# Patient Record
Sex: FEMALE | Race: BLACK OR AFRICAN AMERICAN | NOT HISPANIC OR LATINO | Employment: FULL TIME | URBAN - NONMETROPOLITAN AREA
[De-identification: names, ages, dates, MRNs, and addresses within clinical notes are randomized per-mention and may not be internally consistent; named-entity substitution may affect disease eponyms.]

---

## 2020-04-23 ENCOUNTER — HISTORICAL (OUTPATIENT)
Dept: ADMINISTRATIVE | Facility: HOSPITAL | Age: 30
End: 2020-04-23

## 2020-04-24 LAB — CALCIUM BLD-MCNC: NEGATIVE MG/DL

## 2020-05-07 ENCOUNTER — HISTORICAL (OUTPATIENT)
Dept: ADMINISTRATIVE | Facility: HOSPITAL | Age: 30
End: 2020-05-07

## 2020-05-07 LAB
ALBUMIN SERPL BCP-MCNC: 3.4 G/DL (ref 3.5–5)
ALBUMIN/GLOB SERPL ELPH: 0.8 {RATIO} (ref 1.5–2.2)
ALP SERPL-CCNC: 46 U/L (ref 45–117)
ALT SERPL W P-5'-P-CCNC: 23 U/L (ref 13–56)
ANION GAP SERPL CALC-SCNC: 11 MEQ/L (ref 10–20)
APPEARANCE, UA: CLEAR
AST SERPL-CCNC: 19 U/L (ref 15–37)
BACTERIA SPEC CULT: NEGATIVE /HPF
BASOPHILS NFR BLD: 0 10 (ref 0–0.1)
BASOPHILS NFR BLD: 0.4 % (ref 0–1.5)
BILIRUB SERPL-MCNC: 0.24 MG/DL (ref 0.2–1)
BILIRUB UR QL STRIP: NEGATIVE MG/DL
BUDDING YEAST: NORMAL /HPF
BUN SERPL-MCNC: 9 MG/DL (ref 7–18)
CALCIUM SERPL-MCNC: 8.1 MG/DL (ref 8.5–10.1)
CASTS, URINE MICROSCOPIC: NEGATIVE /LPF
CHLORIDE SERPL-SCNC: 109 MMOL/L (ref 98–107)
CHOLEST SERPL-MSCNC: 222 MG/DL (ref 0–200)
CHOLEST/HDLC SERPL: 5.2 {RATIO}
CO2 SERPL-SCNC: 24 MMOL/L (ref 22–32)
COLOR UR: YELLOW
CREAT SERPL-MCNC: 0.76 MG/DL (ref 0.55–1.02)
EGFR: 114 ML/MIN/1.73M
EOSINOPHIL NFR BLD: 0.1 10 (ref 0–0.7)
EOSINOPHIL NFR BLD: 1.9 % (ref 0–7)
EPITHELIAL, URINE MICROSCOPIC: NEGATIVE /HPF
ERYTHROCYTE [DISTWIDTH] IN BLOOD BY AUTOMATED COUNT: 12.7 % (ref 11.5–14.5)
ESTIMATED AVG GLUCOSE: 5.7 % (ref 4.2–6.3)
GLOBULIN: 4.1 G/DL (ref 2.3–3.5)
GLUCOSE (UA): NEGATIVE MG/DL
GLUCOSE SERPL-MCNC: 91 MG/DL (ref 70–99)
GRAN #: 4.06 10 (ref 2–7.5)
GRAN%: 0.3 %
GRAN%: 59.9 % (ref 50–80)
HCT VFR BLD AUTO: 36.8 % (ref 37.7–47.9)
HDL/CHOLESTEROL RATIO: 43 MG/DL (ref 40–60)
HGB BLD-MCNC: 11.7 G/DL (ref 12.2–16.2)
HGB UR QL STRIP: NEGATIVE ERY/UL
IMMATURE GRANULOCYTES #: 0.02 10
KETONES UR QL STRIP: NEGATIVE MG/DL
LDLC SERPL CALC-MCNC: 158 MG/DL (ref 0–130)
LEUKOCYTE ESTERASE UR QL STRIP: NEGATIVE LEU/UL
LYMPH #: 2.1 10 (ref 1–3.5)
LYMPH%: 31.2 % (ref 12–50)
Lab: 27.2 % (ref 30–39)
MAGNESIUM SERPL-MCNC: 1.96 MG/DL (ref 1.8–2.4)
MCH RBC QN AUTO: 26.1 PG (ref 27–31)
MCHC RBC AUTO-ENTMCNC: 31.8 G% (ref 32–35)
MCV RBC AUTO: 82.1 FL (ref 80–97)
MONO #: 0.4 10 (ref 0–0.8)
MONO%: 6.3 % (ref 0–12)
NITRITE UR QL STRIP: NEGATIVE MG/DL
OSMOC: 278 MOSM/KG (ref 275–295)
PH UR STRIP: 5.5 [PH] (ref 5–7.5)
PMV BLD AUTO: 10 FL (ref 7.4–10.4)
PMV BLD AUTO: 185 10 (ref 142–424)
POTASSIUM SERPL-SCNC: 4 MMOL/L (ref 3.5–5.1)
PROT SERPL-MCNC: 7.5 G/DL (ref 6.4–8.2)
PROT UR QL STRIP: NEGATIVE MG/DL
RBC # BLD AUTO: 4.48 M/UL (ref 4.04–5.48)
RBC #/AREA URNS HPF: NEGATIVE /HPF
RPR: NON REACTIVE
SODIUM BLD-SCNC: 140 MMOL/L (ref 136–145)
SP GR UR STRIP: 1.02 (ref 1–1.03)
SPERM, URINE MICROSCOPIC: NORMAL /HPF
T4,THYROXINE: 10.4 UG/DL (ref 4.8–13.9)
T7: 2.8 (ref 1.4–4.5)
TRIGL SERPL-MCNC: 105 MG/ML (ref 30–150)
TSH SERPL DL<=0.005 MIU/L-ACNC: 1.42 UIU/ML (ref 0.36–3.74)
TYPE OF SPECIMEN  (UA): NORMAL
UNCLASSIFIED CRYSTALS, UA: NORMAL /HPF
UROBILINOGEN UR STRIP-ACNC: 1 EU/L
WBC # BLD AUTO: 6.8 10 (ref 4–10.2)
WBC #/AREA URNS HPF: NEGATIVE /HPF

## 2020-05-08 LAB
25(OH)D3 SERPL-MCNC: 32.3 NG/ML (ref 30–100)
HIV 1+2 AB+HIV1 P24 AG SERPL QL IA: NON REACTIVE
IGA SERPL-MCNC: 197 MG/DL (ref 87–352)
IGG SERPL-MCNC: 1839 MG/DL (ref 586–1602)
IGM: 260 MG/DL (ref 26–217)

## 2020-05-09 LAB — T4 FREE SP9 P CHAL SERPL-SCNC: 1 NG/DL (ref 0.76–1.46)

## 2020-05-12 LAB
H PYLORI IGG SER IA-ACNC: 0.31 INDEX VALUE (ref 0–0.79)
Lab: <9 UNITS (ref 0–8.9)

## 2020-05-13 LAB — Lab: <9 UNITS (ref 0–8.9)

## 2020-09-16 ENCOUNTER — LAB VISIT (OUTPATIENT)
Dept: LAB | Facility: HOSPITAL | Age: 30
End: 2020-09-16
Attending: INTERNAL MEDICINE
Payer: COMMERCIAL

## 2020-09-16 DIAGNOSIS — Z01.21 ENCOUNTER FOR DENTAL EXAMINATION AND CLEANING WITH ABNORMAL FINDINGS: ICD-10-CM

## 2020-09-16 DIAGNOSIS — Z13.9 SCREENING FOR UNSPECIFIED CONDITION: Primary | ICD-10-CM

## 2020-09-16 LAB
25(OH)D3+25(OH)D2 SERPL-MCNC: 25 NG/ML (ref 30–96)
ALBUMIN SERPL BCP-MCNC: 3.5 G/DL (ref 3.5–5.2)
ALBUMIN/CREAT UR: ABNORMAL MG/MG (ref 0–30)
ALP SERPL-CCNC: 41 U/L (ref 55–135)
ALT SERPL W/O P-5'-P-CCNC: 30 U/L (ref 10–44)
ANION GAP SERPL CALC-SCNC: 4 MMOL/L (ref 8–16)
AST SERPL-CCNC: 26 U/L (ref 10–40)
BASOPHILS # BLD AUTO: 0.02 K/UL (ref 0–0.2)
BASOPHILS NFR BLD: 0.3 % (ref 0–1.9)
BILIRUB SERPL-MCNC: 0.3 MG/DL (ref 0.1–1)
BUN SERPL-MCNC: 7 MG/DL (ref 6–20)
CALCIUM SERPL-MCNC: 8.6 MG/DL (ref 8.7–10.5)
CHLORIDE SERPL-SCNC: 109 MMOL/L (ref 95–110)
CHOLEST SERPL-MCNC: 193 MG/DL (ref 120–199)
CHOLEST/HDLC SERPL: 5.7 {RATIO} (ref 2–5)
CO2 SERPL-SCNC: 26 MMOL/L (ref 23–29)
CREAT SERPL-MCNC: 0.8 MG/DL (ref 0.5–1.4)
CREAT UR-MCNC: 342 MG/DL (ref 15–325)
DIFFERENTIAL METHOD: ABNORMAL
EOSINOPHIL # BLD AUTO: 0.1 K/UL (ref 0–0.5)
EOSINOPHIL NFR BLD: 1.5 % (ref 0–8)
ERYTHROCYTE [DISTWIDTH] IN BLOOD BY AUTOMATED COUNT: 12.9 % (ref 11.5–14.5)
EST. GFR  (AFRICAN AMERICAN): >60 ML/MIN/1.73 M^2
EST. GFR  (NON AFRICAN AMERICAN): >60 ML/MIN/1.73 M^2
ESTIMATED AVG GLUCOSE: 117 MG/DL (ref 68–131)
FERRITIN SERPL-MCNC: 58 NG/ML (ref 20–300)
FOLATE SERPL-MCNC: 23.6 NG/ML (ref 4–24)
GLUCOSE SERPL-MCNC: 85 MG/DL (ref 70–110)
HBA1C MFR BLD HPLC: 5.7 % (ref 4–5.6)
HCT VFR BLD AUTO: 34.8 % (ref 37–48.5)
HCYS SERPL-SCNC: 6.6 UMOL/L (ref 4–15.5)
HDLC SERPL-MCNC: 34 MG/DL (ref 40–75)
HDLC SERPL: 17.6 % (ref 20–50)
HGB BLD-MCNC: 10.9 G/DL (ref 12–16)
IMM GRANULOCYTES # BLD AUTO: 0.02 K/UL (ref 0–0.04)
IMM GRANULOCYTES NFR BLD AUTO: 0.3 % (ref 0–0.5)
INSULIN COLLECTION INTERVAL: NORMAL
INSULIN SERPL-ACNC: 8.5 UU/ML
IRON SATN MFR SERPL: 18 % (ref 20–50)
IRON SERPL-MCNC: 55 UG/DL (ref 30–160)
LDLC SERPL CALC-MCNC: 126.8 MG/DL (ref 63–159)
LYMPHOCYTES # BLD AUTO: 2.5 K/UL (ref 1–4.8)
LYMPHOCYTES NFR BLD: 34.5 % (ref 18–48)
MAGNESIUM SERPL-MCNC: 1.9 MG/DL (ref 1.6–2.6)
MCH RBC QN AUTO: 25.7 PG (ref 27–31)
MCHC RBC AUTO-ENTMCNC: 31.3 G/DL (ref 32–36)
MCV RBC AUTO: 82 FL (ref 82–98)
MICROALBUMIN UR DL<=1MG/L-MCNC: 31.3 MG/L
MONOCYTES # BLD AUTO: 0.4 K/UL (ref 0.3–1)
MONOCYTES NFR BLD: 6.2 % (ref 4–15)
NEUTROPHILS # BLD AUTO: 4.1 K/UL (ref 1.8–7.7)
NEUTROPHILS NFR BLD: 57.2 % (ref 38–73)
NONHDLC SERPL-MCNC: 159 MG/DL
NRBC BLD-RTO: 0 /100 WBC
PLATELET # BLD AUTO: 281 K/UL (ref 150–350)
PMV BLD AUTO: 9.1 FL (ref 9.2–12.9)
POTASSIUM SERPL-SCNC: 3.8 MMOL/L (ref 3.5–5.1)
PROT SERPL-MCNC: 8.1 G/DL (ref 6–8.4)
PROT UR-MCNC: 20.8 MG/DL (ref 0–15)
RBC # BLD AUTO: 4.24 M/UL (ref 4–5.4)
RETICS/RBC NFR AUTO: 1.6 % (ref 0.5–2.5)
SODIUM SERPL-SCNC: 139 MMOL/L (ref 136–145)
T3FREE SERPL-MCNC: 3.6 PG/ML (ref 2.3–4.2)
T4 FREE SERPL-MCNC: 0.88 NG/DL (ref 0.71–1.51)
T4 SERPL-MCNC: 10.7 UG/DL (ref 4.5–11.5)
TOTAL IRON BINDING CAPACITY: 305 UG/DL (ref 250–450)
TRIGL SERPL-MCNC: 161 MG/DL (ref 30–150)
TSH SERPL DL<=0.005 MIU/L-ACNC: 0.64 UIU/ML (ref 0.4–4)
URATE SERPL-MCNC: 5.3 MG/DL (ref 2.4–5.7)
VIT B12 SERPL-MCNC: 1047 PG/ML (ref 210–950)
WBC # BLD AUTO: 7.13 K/UL (ref 3.9–12.7)

## 2020-09-16 PROCEDURE — 84436 ASSAY OF TOTAL THYROXINE: CPT

## 2020-09-16 PROCEDURE — 80061 LIPID PANEL: CPT

## 2020-09-16 PROCEDURE — 83036 HEMOGLOBIN GLYCOSYLATED A1C: CPT

## 2020-09-16 PROCEDURE — 83525 ASSAY OF INSULIN: CPT

## 2020-09-16 PROCEDURE — 36415 COLL VENOUS BLD VENIPUNCTURE: CPT

## 2020-09-16 PROCEDURE — 82043 UR ALBUMIN QUANTITATIVE: CPT

## 2020-09-16 PROCEDURE — 83540 ASSAY OF IRON: CPT

## 2020-09-16 PROCEDURE — 84165 PATHOLOGIST INTERPRETATION SPE: ICD-10-PCS | Mod: 26,,, | Performed by: PATHOLOGY

## 2020-09-16 PROCEDURE — 85025 COMPLETE CBC W/AUTO DIFF WBC: CPT

## 2020-09-16 PROCEDURE — 82728 ASSAY OF FERRITIN: CPT

## 2020-09-16 PROCEDURE — 82607 VITAMIN B-12: CPT

## 2020-09-16 PROCEDURE — 82172 ASSAY OF APOLIPOPROTEIN: CPT

## 2020-09-16 PROCEDURE — 83695 ASSAY OF LIPOPROTEIN(A): CPT

## 2020-09-16 PROCEDURE — 85045 AUTOMATED RETICULOCYTE COUNT: CPT

## 2020-09-16 PROCEDURE — 80053 COMPREHEN METABOLIC PANEL: CPT

## 2020-09-16 PROCEDURE — 82306 VITAMIN D 25 HYDROXY: CPT

## 2020-09-16 PROCEDURE — 84439 ASSAY OF FREE THYROXINE: CPT

## 2020-09-16 PROCEDURE — 83735 ASSAY OF MAGNESIUM: CPT

## 2020-09-16 PROCEDURE — 84481 FREE ASSAY (FT-3): CPT

## 2020-09-16 PROCEDURE — 84443 ASSAY THYROID STIM HORMONE: CPT

## 2020-09-16 PROCEDURE — 84156 ASSAY OF PROTEIN URINE: CPT

## 2020-09-16 PROCEDURE — 82542 COL CHROMOTOGRAPHY QUAL/QUAN: CPT

## 2020-09-16 PROCEDURE — 84165 PROTEIN E-PHORESIS SERUM: CPT

## 2020-09-16 PROCEDURE — 82746 ASSAY OF FOLIC ACID SERUM: CPT

## 2020-09-16 PROCEDURE — 84550 ASSAY OF BLOOD/URIC ACID: CPT

## 2020-09-16 PROCEDURE — 84165 PROTEIN E-PHORESIS SERUM: CPT | Mod: 26,,, | Performed by: PATHOLOGY

## 2020-09-16 PROCEDURE — 83090 ASSAY OF HOMOCYSTEINE: CPT

## 2020-09-18 LAB
ALBUMIN SERPL ELPH-MCNC: 4.04 G/DL (ref 3.35–5.55)
ALPHA1 GLOB SERPL ELPH-MCNC: 0.36 G/DL (ref 0.17–0.41)
ALPHA2 GLOB SERPL ELPH-MCNC: 0.54 G/DL (ref 0.43–0.99)
APO B SERPL-MCNC: 131 MG/DL
B-GLOBULIN SERPL ELPH-MCNC: 0.84 G/DL (ref 0.5–1.1)
GAMMA GLOB SERPL ELPH-MCNC: 1.63 G/DL (ref 0.67–1.58)
PATHOLOGIST INTERPRETATION SPE: NORMAL
PROT SERPL-MCNC: 7.4 G/DL (ref 6–8.4)

## 2020-09-22 LAB — LPA SERPL-MCNC: 167 MG/DL (ref 0–30)

## 2020-09-24 LAB — UBIQUINONE10 SERPL-MCNC: 0.73 UG/ML

## 2021-05-27 ENCOUNTER — IMMUNIZATION (OUTPATIENT)
Dept: OBSTETRICS AND GYNECOLOGY | Facility: CLINIC | Age: 31
End: 2021-05-27
Payer: COMMERCIAL

## 2021-05-27 DIAGNOSIS — Z23 NEED FOR VACCINATION: Primary | ICD-10-CM

## 2021-05-27 PROCEDURE — 0001A COVID-19, MRNA, LNP-S, PF, 30 MCG/0.3 ML DOSE VACCINE: CPT | Mod: CV19,,, | Performed by: ANESTHESIOLOGY

## 2021-05-27 PROCEDURE — 91300 COVID-19, MRNA, LNP-S, PF, 30 MCG/0.3 ML DOSE VACCINE: ICD-10-PCS | Mod: ,,, | Performed by: ANESTHESIOLOGY

## 2021-05-27 PROCEDURE — 0001A COVID-19, MRNA, LNP-S, PF, 30 MCG/0.3 ML DOSE VACCINE: ICD-10-PCS | Mod: CV19,,, | Performed by: ANESTHESIOLOGY

## 2021-05-27 PROCEDURE — 91300 COVID-19, MRNA, LNP-S, PF, 30 MCG/0.3 ML DOSE VACCINE: CPT | Mod: ,,, | Performed by: ANESTHESIOLOGY

## 2021-06-17 ENCOUNTER — IMMUNIZATION (OUTPATIENT)
Dept: OBSTETRICS AND GYNECOLOGY | Facility: CLINIC | Age: 31
End: 2021-06-17
Payer: COMMERCIAL

## 2021-06-17 DIAGNOSIS — Z23 NEED FOR VACCINATION: Primary | ICD-10-CM

## 2021-06-17 PROCEDURE — 91300 COVID-19, MRNA, LNP-S, PF, 30 MCG/0.3 ML DOSE VACCINE: CPT | Mod: ,,, | Performed by: ANESTHESIOLOGY

## 2021-06-17 PROCEDURE — 91300 COVID-19, MRNA, LNP-S, PF, 30 MCG/0.3 ML DOSE VACCINE: ICD-10-PCS | Mod: ,,, | Performed by: ANESTHESIOLOGY

## 2021-06-17 PROCEDURE — 0002A COVID-19, MRNA, LNP-S, PF, 30 MCG/0.3 ML DOSE VACCINE: CPT | Mod: CV19,,, | Performed by: ANESTHESIOLOGY

## 2021-06-17 PROCEDURE — 0002A COVID-19, MRNA, LNP-S, PF, 30 MCG/0.3 ML DOSE VACCINE: ICD-10-PCS | Mod: CV19,,, | Performed by: ANESTHESIOLOGY

## 2022-10-31 ENCOUNTER — OFFICE VISIT (OUTPATIENT)
Dept: PRIMARY CARE CLINIC | Facility: CLINIC | Age: 32
End: 2022-10-31
Payer: MEDICAID

## 2022-10-31 VITALS
SYSTOLIC BLOOD PRESSURE: 133 MMHG | HEIGHT: 69 IN | BODY MASS INDEX: 32.73 KG/M2 | OXYGEN SATURATION: 99 % | WEIGHT: 221 LBS | DIASTOLIC BLOOD PRESSURE: 84 MMHG | TEMPERATURE: 99 F | HEART RATE: 82 BPM | RESPIRATION RATE: 12 BRPM

## 2022-10-31 DIAGNOSIS — Z13.6 ENCOUNTER FOR LIPID SCREENING FOR CARDIOVASCULAR DISEASE: ICD-10-CM

## 2022-10-31 DIAGNOSIS — R03.0 ELEVATED BLOOD PRESSURE READING: Primary | ICD-10-CM

## 2022-10-31 DIAGNOSIS — Z28.21 IMMUNIZATION DECLINED: ICD-10-CM

## 2022-10-31 DIAGNOSIS — Z13.220 ENCOUNTER FOR LIPID SCREENING FOR CARDIOVASCULAR DISEASE: ICD-10-CM

## 2022-10-31 PROCEDURE — 1159F MED LIST DOCD IN RCRD: CPT | Mod: CPTII,,, | Performed by: STUDENT IN AN ORGANIZED HEALTH CARE EDUCATION/TRAINING PROGRAM

## 2022-10-31 PROCEDURE — 3079F DIAST BP 80-89 MM HG: CPT | Mod: CPTII,,, | Performed by: STUDENT IN AN ORGANIZED HEALTH CARE EDUCATION/TRAINING PROGRAM

## 2022-10-31 PROCEDURE — 99999 PR PBB SHADOW E&M-EST. PATIENT-LVL IV: CPT | Mod: PBBFAC,,, | Performed by: STUDENT IN AN ORGANIZED HEALTH CARE EDUCATION/TRAINING PROGRAM

## 2022-10-31 PROCEDURE — 99999 PR PBB SHADOW E&M-EST. PATIENT-LVL IV: ICD-10-PCS | Mod: PBBFAC,,, | Performed by: STUDENT IN AN ORGANIZED HEALTH CARE EDUCATION/TRAINING PROGRAM

## 2022-10-31 PROCEDURE — 99203 OFFICE O/P NEW LOW 30 MIN: CPT | Mod: S$PBB,,, | Performed by: STUDENT IN AN ORGANIZED HEALTH CARE EDUCATION/TRAINING PROGRAM

## 2022-10-31 PROCEDURE — 99214 OFFICE O/P EST MOD 30 MIN: CPT | Mod: PBBFAC,PN | Performed by: STUDENT IN AN ORGANIZED HEALTH CARE EDUCATION/TRAINING PROGRAM

## 2022-10-31 PROCEDURE — 3079F PR MOST RECENT DIASTOLIC BLOOD PRESSURE 80-89 MM HG: ICD-10-PCS | Mod: CPTII,,, | Performed by: STUDENT IN AN ORGANIZED HEALTH CARE EDUCATION/TRAINING PROGRAM

## 2022-10-31 PROCEDURE — 99203 PR OFFICE/OUTPT VISIT, NEW, LEVL III, 30-44 MIN: ICD-10-PCS | Mod: S$PBB,,, | Performed by: STUDENT IN AN ORGANIZED HEALTH CARE EDUCATION/TRAINING PROGRAM

## 2022-10-31 PROCEDURE — 1159F PR MEDICATION LIST DOCUMENTED IN MEDICAL RECORD: ICD-10-PCS | Mod: CPTII,,, | Performed by: STUDENT IN AN ORGANIZED HEALTH CARE EDUCATION/TRAINING PROGRAM

## 2022-10-31 PROCEDURE — 3075F PR MOST RECENT SYSTOLIC BLOOD PRESS GE 130-139MM HG: ICD-10-PCS | Mod: CPTII,,, | Performed by: STUDENT IN AN ORGANIZED HEALTH CARE EDUCATION/TRAINING PROGRAM

## 2022-10-31 PROCEDURE — 3075F SYST BP GE 130 - 139MM HG: CPT | Mod: CPTII,,, | Performed by: STUDENT IN AN ORGANIZED HEALTH CARE EDUCATION/TRAINING PROGRAM

## 2022-10-31 NOTE — PROGRESS NOTES
[facility] Note    Chief Complaint      Chief Complaint   Patient presents with    Establish Care     Needs a pcp       History of Present Illness      Mauricio Diallo is a 31 y.o. female who presents today for Establish Care (Needs a pcp)  Obtain medical records from PCP, Dr. Landa.  Currently has no medical concerns, but would like to be able to start eating healthy and lose weight.   No significant medical history, no complaints at this time.   Patient has had two term pregnancy in the past both delivery via  section. First secondary to elevated blood pressure, but denies diagnosis of preeclampsia or pregnancy related hypertension and never placed on blood pressure medicines.   Family history significant for mother with Lupus and hypertension.    Past Medical History:  Past Medical History:   Diagnosis Date    Obese      Past Surgical History:   has no past surgical history on file.    Family History:  family history includes Cancer in her father; Hypertension in her mother; Lupus in her mother; No Known Problems in her son.     Social History:  Social History     Tobacco Use    Smoking status: Never    Smokeless tobacco: Never   Substance Use Topics    Alcohol use: No    Drug use: No     I personally reviewed all past medical, surgical, social and family history.    Review of Systems   Constitutional:  Negative for chills, fever, malaise/fatigue and weight loss.   HENT:  Negative for congestion, ear discharge, ear pain, sinus pain and sore throat.    Eyes:  Negative for blurred vision, pain, discharge and redness.   Respiratory:  Negative for cough, hemoptysis, sputum production, shortness of breath, wheezing and stridor.    Cardiovascular:  Negative for chest pain, palpitations and leg swelling.   Gastrointestinal:  Negative for abdominal pain, blood in stool, constipation, diarrhea, nausea and vomiting.   Genitourinary:  Negative for dysuria, frequency and hematuria.    Musculoskeletal:  Negative for back pain, falls, joint pain, myalgias and neck pain.   Skin: Negative.  Negative for rash.   Neurological:  Negative for dizziness, tingling, focal weakness, seizures, weakness and headaches.   Endo/Heme/Allergies:  Negative for environmental allergies and polydipsia. Does not bruise/bleed easily.   Psychiatric/Behavioral:  Negative for depression and suicidal ideas. The patient is not nervous/anxious.       Medications:  Outpatient Encounter Medications as of 10/31/2022   Medication Sig Dispense Refill    JUNEL FE 1/20, 28, 1 mg-20 mcg (21)/75 mg (7) per tablet Take 1 tablet by mouth once daily.  12     No facility-administered encounter medications on file as of 10/31/2022.     Allergies:  Review of patient's allergies indicates:  No Known Allergies    Health Maintenance:  Immunization History   Administered Date(s) Administered    COVID-19, MRNA, LN-S, PF (MODERNA FULL 0.5 ML DOSE) 03/08/2021, 08/16/2021    COVID-19, MRNA, LN-S, PF (Pfizer) (Purple Cap) 05/27/2021, 06/17/2021    DTP 03/30/1989, 04/05/1989, 05/25/1989, 05/31/1989, 08/02/1989, 08/24/1989, 05/30/1990, 04/13/1994    Hepatitis B 10/28/2002    Hepatitis B, Adult 12/10/2007, 05/03/2018, 06/08/2018, 11/20/2018    Hepatitis B, Pediatric/Adolescent 03/19/2003, 03/02/2005, 07/31/2006    IPV 03/30/1989, 05/25/1989    Influenza - Quadrivalent - PF *Preferred* (6 months and older) 10/09/2018, 10/29/2019, 10/06/2020, 12/22/2021    Influenza A (H1N1) 2009 Monovalent - IM 11/16/2009    MMR 05/30/1990, 04/13/1994, 01/29/2008    OPV 04/05/1989, 05/31/1989, 05/30/1990, 04/13/1994    Td - PF (ADULT) 10/28/2002, 03/02/2005    Tdap 10/21/2016, 09/05/2017, 12/23/2021      Health Maintenance   Topic Date Due    TETANUS VACCINE  12/23/2031    Hepatitis C Screening  Completed    Lipid Panel  Completed     Health Maintenance Topics with due status: Not Due       Topic Last Completion Date    TETANUS VACCINE 12/23/2021     Health Maintenance  "Due   Topic Date Due    Cervical Cancer Screening  Never done    COVID-19 Vaccine (5 - Booster) 10/11/2021    Influenza Vaccine (1) 09/01/2022     Physical Exam      Vital Signs  Temp: 98.7 °F (37.1 °C)  Temp src: Oral  Pulse: 82  Resp: 12  SpO2: 99 %  BP: 133/84  BP Location: Left arm  Patient Position: Sitting  Pain Score: 0-No pain  Height and Weight  Height: 5' 9" (175.3 cm)  Weight: 100.2 kg (221 lb)  BSA (Calculated - sq m): 2.21 sq meters  BMI (Calculated): 32.6  Weight in (lb) to have BMI = 25: 168.9]    Physical Exam  Vitals reviewed.   Constitutional:       General: She is not in acute distress.     Appearance: Normal appearance. She is normal weight. She is not ill-appearing or toxic-appearing.   HENT:      Head: Normocephalic and atraumatic.      Right Ear: External ear normal.      Left Ear: External ear normal.      Nose: Nose normal.      Mouth/Throat:      Mouth: Mucous membranes are moist.      Pharynx: Oropharynx is clear.   Eyes:      Extraocular Movements: Extraocular movements intact.      Conjunctiva/sclera: Conjunctivae normal.   Cardiovascular:      Rate and Rhythm: Normal rate and regular rhythm.      Pulses: Normal pulses.      Heart sounds: Normal heart sounds.   Pulmonary:      Effort: Pulmonary effort is normal. No respiratory distress.      Breath sounds: No stridor. No wheezing, rhonchi or rales.   Abdominal:      General: Abdomen is flat. Bowel sounds are normal.      Palpations: Abdomen is soft.   Musculoskeletal:         General: No tenderness. Normal range of motion.      Cervical back: Normal range of motion and neck supple. No rigidity or tenderness.   Skin:     General: Skin is warm and dry.      Capillary Refill: Capillary refill takes less than 2 seconds.   Neurological:      General: No focal deficit present.      Mental Status: She is alert and oriented to person, place, and time. Mental status is at baseline.      Motor: No weakness.      Gait: Gait normal.   Psychiatric:   "       Mood and Affect: Mood normal.         Behavior: Behavior normal.         Thought Content: Thought content normal.         Judgment: Judgment normal.      Assessment/Plan     Mauricio Diallo is a 31 y.o.female with:    Problem List Items Addressed This Visit          Cardiac/Vascular    Elevated blood pressure reading - Primary    Current Assessment & Plan     Walks everyday, but does endorse not eating as healthy. Denies symptoms.   Will continue to monitor.   - Follow low sodium diet <2g per day  - DASH diet instructions provided  - Food choices and list provided to maintain daily balanced nutrition  - f/u CMP and lipid panel           Relevant Orders    CBC Auto Differential    Comprehensive Metabolic Panel    Encounter for lipid screening for cardiovascular disease    Relevant Orders    Lipid Panel       ID    Immunization declined    Current Assessment & Plan     Patient declines immunization against COVID19 virus and influenza virus for this season 22-23.             Endocrine    BMI 32.0-32.9,adult    Current Assessment & Plan     Wt Readings from Last 3 Encounters:   10/31/22 1402 100.2 kg (221 lb)   05/14/19 1501 92.1 kg (203 lb)   09/18/18 0859 92.2 kg (203 lb 4.2 oz)   Recommendations:   Stay physically active. As tolerated alternate resistance training with stretching and cardio. Goal of 150 minutes per week of moderate intensity activity or 7,500 - 10,000 steps per day. Follow the Mediterranean Diet. Include whole fresh fruits, vegetables, olive oil, seeds, nuts, whole grains, cold water fish, salmon, mackerel and lean cuts of meat.  Do not drink sugary/diet carbonated beverages. Decrease portion sizes slightly which will result in an approximately 500-calorie deficit. Avoid fast or fried and processed food, especially canned foods. Avoid refined carbohydrates, white starchy foods, flour, white potato, bread, muffins, and cakes. Consider substituting one meal a day with a meal replacement  such as Slim fast, lean cuisine, or weight watcher's. Follow a healthy diet that includes enough calcium, vitamin D and proteins for bone health.              Other than changes above, cont current medications and maintain follow up with specialists.  Follow up in about 4 weeks (around 11/28/2022).    Future Appointments   Date Time Provider Department Center   12/2/2022  2:00 PM Erum Gomez DO [facility]       Kazumi G Yoshinaga, DO [facility]

## 2022-10-31 NOTE — ASSESSMENT & PLAN NOTE
Wt Readings from Last 3 Encounters:   10/31/22 1402 100.2 kg (221 lb)   05/14/19 1501 92.1 kg (203 lb)   09/18/18 0859 92.2 kg (203 lb 4.2 oz)   Recommendations:   Stay physically active. As tolerated alternate resistance training with stretching and cardio. Goal of 150 minutes per week of moderate intensity activity or 7,500 - 10,000 steps per day. Follow the Mediterranean Diet. Include whole fresh fruits, vegetables, olive oil, seeds, nuts, whole grains, cold water fish, salmon, mackerel and lean cuts of meat.  Do not drink sugary/diet carbonated beverages. Decrease portion sizes slightly which will result in an approximately 500-calorie deficit. Avoid fast or fried and processed food, especially canned foods. Avoid refined carbohydrates, white starchy foods, flour, white potato, bread, muffins, and cakes. Consider substituting one meal a day with a meal replacement such as Slim fast, lean cuisine, or weight watcher's. Follow a healthy diet that includes enough calcium, vitamin D and proteins for bone health.

## 2022-10-31 NOTE — ASSESSMENT & PLAN NOTE
Walks everyday, but does endorse not eating as healthy. Denies symptoms.   Will continue to monitor.   - Follow low sodium diet <2g per day  - DASH diet instructions provided  - Food choices and list provided to maintain daily balanced nutrition  - f/u CMP and lipid panel

## 2022-12-02 ENCOUNTER — OFFICE VISIT (OUTPATIENT)
Dept: PRIMARY CARE CLINIC | Facility: CLINIC | Age: 32
End: 2022-12-02
Payer: MEDICAID

## 2022-12-02 VITALS
BODY MASS INDEX: 32.44 KG/M2 | OXYGEN SATURATION: 99 % | WEIGHT: 219 LBS | TEMPERATURE: 98 F | RESPIRATION RATE: 14 BRPM | DIASTOLIC BLOOD PRESSURE: 89 MMHG | SYSTOLIC BLOOD PRESSURE: 136 MMHG | HEART RATE: 79 BPM | HEIGHT: 69 IN

## 2022-12-02 DIAGNOSIS — Z28.21 IMMUNIZATION DECLINED: ICD-10-CM

## 2022-12-02 DIAGNOSIS — R63.5 WEIGHT GAIN: ICD-10-CM

## 2022-12-02 DIAGNOSIS — R03.0 ELEVATED BLOOD PRESSURE READING: Primary | ICD-10-CM

## 2022-12-02 DIAGNOSIS — E78.2 MIXED HYPERLIPIDEMIA: ICD-10-CM

## 2022-12-02 LAB
ESTIMATED AVG GLUCOSE: 120 MG/DL (ref 68–131)
HBA1C MFR BLD: 5.8 % (ref 4–5.6)
T4 FREE SERPL-MCNC: 0.81 NG/DL (ref 0.71–1.51)
TSH SERPL DL<=0.005 MIU/L-ACNC: 1.07 UIU/ML (ref 0.4–4)

## 2022-12-02 PROCEDURE — 1160F RVW MEDS BY RX/DR IN RCRD: CPT | Mod: CPTII,,, | Performed by: STUDENT IN AN ORGANIZED HEALTH CARE EDUCATION/TRAINING PROGRAM

## 2022-12-02 PROCEDURE — 3075F PR MOST RECENT SYSTOLIC BLOOD PRESS GE 130-139MM HG: ICD-10-PCS | Mod: CPTII,,, | Performed by: STUDENT IN AN ORGANIZED HEALTH CARE EDUCATION/TRAINING PROGRAM

## 2022-12-02 PROCEDURE — 3079F DIAST BP 80-89 MM HG: CPT | Mod: CPTII,,, | Performed by: STUDENT IN AN ORGANIZED HEALTH CARE EDUCATION/TRAINING PROGRAM

## 2022-12-02 PROCEDURE — 99999 PR PBB SHADOW E&M-EST. PATIENT-LVL III: ICD-10-PCS | Mod: PBBFAC,,, | Performed by: STUDENT IN AN ORGANIZED HEALTH CARE EDUCATION/TRAINING PROGRAM

## 2022-12-02 PROCEDURE — 3075F SYST BP GE 130 - 139MM HG: CPT | Mod: CPTII,,, | Performed by: STUDENT IN AN ORGANIZED HEALTH CARE EDUCATION/TRAINING PROGRAM

## 2022-12-02 PROCEDURE — 84439 ASSAY OF FREE THYROXINE: CPT | Performed by: STUDENT IN AN ORGANIZED HEALTH CARE EDUCATION/TRAINING PROGRAM

## 2022-12-02 PROCEDURE — 1160F PR REVIEW ALL MEDS BY PRESCRIBER/CLIN PHARMACIST DOCUMENTED: ICD-10-PCS | Mod: CPTII,,, | Performed by: STUDENT IN AN ORGANIZED HEALTH CARE EDUCATION/TRAINING PROGRAM

## 2022-12-02 PROCEDURE — 3008F PR BODY MASS INDEX (BMI) DOCUMENTED: ICD-10-PCS | Mod: CPTII,,, | Performed by: STUDENT IN AN ORGANIZED HEALTH CARE EDUCATION/TRAINING PROGRAM

## 2022-12-02 PROCEDURE — 99213 OFFICE O/P EST LOW 20 MIN: CPT | Mod: PBBFAC,PN | Performed by: STUDENT IN AN ORGANIZED HEALTH CARE EDUCATION/TRAINING PROGRAM

## 2022-12-02 PROCEDURE — 84443 ASSAY THYROID STIM HORMONE: CPT | Performed by: STUDENT IN AN ORGANIZED HEALTH CARE EDUCATION/TRAINING PROGRAM

## 2022-12-02 PROCEDURE — 99214 OFFICE O/P EST MOD 30 MIN: CPT | Mod: S$PBB,,, | Performed by: STUDENT IN AN ORGANIZED HEALTH CARE EDUCATION/TRAINING PROGRAM

## 2022-12-02 PROCEDURE — 3079F PR MOST RECENT DIASTOLIC BLOOD PRESSURE 80-89 MM HG: ICD-10-PCS | Mod: CPTII,,, | Performed by: STUDENT IN AN ORGANIZED HEALTH CARE EDUCATION/TRAINING PROGRAM

## 2022-12-02 PROCEDURE — 99999 PR PBB SHADOW E&M-EST. PATIENT-LVL III: CPT | Mod: PBBFAC,,, | Performed by: STUDENT IN AN ORGANIZED HEALTH CARE EDUCATION/TRAINING PROGRAM

## 2022-12-02 PROCEDURE — 3008F BODY MASS INDEX DOCD: CPT | Mod: CPTII,,, | Performed by: STUDENT IN AN ORGANIZED HEALTH CARE EDUCATION/TRAINING PROGRAM

## 2022-12-02 PROCEDURE — 82306 VITAMIN D 25 HYDROXY: CPT | Performed by: STUDENT IN AN ORGANIZED HEALTH CARE EDUCATION/TRAINING PROGRAM

## 2022-12-02 PROCEDURE — 1159F PR MEDICATION LIST DOCUMENTED IN MEDICAL RECORD: ICD-10-PCS | Mod: CPTII,,, | Performed by: STUDENT IN AN ORGANIZED HEALTH CARE EDUCATION/TRAINING PROGRAM

## 2022-12-02 PROCEDURE — 83036 HEMOGLOBIN GLYCOSYLATED A1C: CPT | Performed by: STUDENT IN AN ORGANIZED HEALTH CARE EDUCATION/TRAINING PROGRAM

## 2022-12-02 PROCEDURE — 99214 PR OFFICE/OUTPT VISIT, EST, LEVL IV, 30-39 MIN: ICD-10-PCS | Mod: S$PBB,,, | Performed by: STUDENT IN AN ORGANIZED HEALTH CARE EDUCATION/TRAINING PROGRAM

## 2022-12-02 PROCEDURE — 1159F MED LIST DOCD IN RCRD: CPT | Mod: CPTII,,, | Performed by: STUDENT IN AN ORGANIZED HEALTH CARE EDUCATION/TRAINING PROGRAM

## 2022-12-02 RX ORDER — AMLODIPINE BESYLATE 5 MG/1
5 TABLET ORAL DAILY
Qty: 30 TABLET | Refills: 11 | Status: SHIPPED | OUTPATIENT
Start: 2022-12-02 | End: 2023-12-02

## 2022-12-02 NOTE — ASSESSMENT & PLAN NOTE
Patient has started to cut out daily 3-4 cans of sodas and joined a gym to exercise 2-3 times per week.   - f/u 3 months

## 2022-12-02 NOTE — PROGRESS NOTES
[facility] Note    Chief Complaint      Chief Complaint   Patient presents with    Follow-up     Patient here for follow up visit.  Patient brought her lab work from Dr Cantu office with her today. They were collected 22     History of Present Illness      Mauricio Diallo is a 31 y.o. female who presents today for Follow-up (Patient here for follow up visit.  Patient brought her lab work from Dr Cantu office with her today. They were collected 22)  Obtain medical records from PCP, Dr. Landa.  Currently has no medical concerns, but would like to be able to start eating healthy and lose weight.   No significant medical history, no complaints at this time.   Patient has had two term pregnancy in the past both delivery via  section. First secondary to elevated blood pressure, but denies diagnosis of preeclampsia or pregnancy related hypertension and never placed on blood pressure medicines.   Family history significant for mother with Lupus and hypertension.    Reviewed recent outside labs:  Lipid profile: , , HDL 42,   HG/HCT 11.4/33.6  MCV 81  Normal renal and hepatic functions.     Past Medical History:  Past Medical History:   Diagnosis Date    Obese      Past Surgical History:   has no past surgical history on file.    Family History:  family history includes Cancer in her father; Hypertension in her mother; Lupus in her mother; No Known Problems in her son.     Social History:  Social History     Tobacco Use    Smoking status: Never    Smokeless tobacco: Never   Substance Use Topics    Alcohol use: No    Drug use: No     I personally reviewed all past medical, surgical, social and family history.    Review of Systems   Constitutional:  Negative for chills, fever, malaise/fatigue and weight loss.   HENT:  Negative for congestion, ear discharge, ear pain, sinus pain and sore throat.    Eyes:  Negative for blurred vision, pain, discharge and  redness.   Respiratory:  Negative for cough, hemoptysis, sputum production, shortness of breath, wheezing and stridor.    Cardiovascular:  Negative for chest pain, palpitations and leg swelling.   Gastrointestinal:  Negative for abdominal pain, blood in stool, constipation, diarrhea, nausea and vomiting.   Genitourinary:  Negative for dysuria, frequency and hematuria.   Musculoskeletal:  Negative for back pain, falls, joint pain, myalgias and neck pain.   Skin: Negative.  Negative for rash.   Neurological:  Negative for dizziness, tingling, focal weakness, seizures, weakness and headaches.   Endo/Heme/Allergies:  Negative for environmental allergies and polydipsia. Does not bruise/bleed easily.   Psychiatric/Behavioral:  Negative for depression and suicidal ideas. The patient is not nervous/anxious.       Medications:  Outpatient Encounter Medications as of 12/2/2022   Medication Sig Dispense Refill    amLODIPine (NORVASC) 5 MG tablet Take 1 tablet (5 mg total) by mouth once daily. 30 tablet 11    JUNEL FE 1/20, 28, 1 mg-20 mcg (21)/75 mg (7) per tablet Take 1 tablet by mouth once daily.  12     No facility-administered encounter medications on file as of 12/2/2022.     Allergies:  Review of patient's allergies indicates:  No Known Allergies    Health Maintenance:  Immunization History   Administered Date(s) Administered    COVID-19, MRNA, LN-S, PF (MODERNA FULL 0.5 ML DOSE) 03/08/2021, 08/16/2021    COVID-19, MRNA, LN-S, PF (Pfizer) (Purple Cap) 05/27/2021, 06/17/2021    DTP 03/30/1989, 04/05/1989, 05/25/1989, 05/31/1989, 08/02/1989, 08/24/1989, 05/30/1990, 04/13/1994    Hepatitis B 10/28/2002    Hepatitis B, Adult 12/10/2007, 05/03/2018, 06/08/2018, 11/20/2018    Hepatitis B, Pediatric/Adolescent 03/19/2003, 03/02/2005, 07/31/2006    IPV 03/30/1989, 05/25/1989    Influenza - Quadrivalent - PF *Preferred* (6 months and older) 10/09/2018, 10/29/2019, 10/06/2020, 12/22/2021, 11/07/2022    Influenza A (H1N1) 2009  "Monovalent - IM 11/16/2009    MMR 05/30/1990, 04/13/1994, 01/29/2008    OPV 04/05/1989, 05/31/1989, 05/30/1990, 04/13/1994    Td - PF (ADULT) 10/28/2002, 03/02/2005    Tdap 10/21/2016, 09/05/2017, 12/23/2021      Health Maintenance   Topic Date Due    TETANUS VACCINE  12/23/2031    Hepatitis C Screening  Completed    Lipid Panel  Completed     Health Maintenance Topics with due status: Not Due       Topic Last Completion Date    TETANUS VACCINE 12/23/2021    Cervical Cancer Screening Not Due     Health Maintenance Due   Topic Date Due    COVID-19 Vaccine (5 - Booster) 10/11/2021     Physical Exam      Vital Signs  Temp: 98.3 °F (36.8 °C)  Temp src: Oral  Pulse: 79  Resp: 14  SpO2: 99 %  BP: 136/89  BP Location: Left arm  Patient Position: Sitting  Pain Score: 0-No pain  Height and Weight  Height: 5' 9" (175.3 cm)  Weight: 99.3 kg (219 lb)  BSA (Calculated - sq m): 2.2 sq meters  BMI (Calculated): 32.3  Weight in (lb) to have BMI = 25: 168.9]    Physical Exam  Vitals reviewed.   Constitutional:       General: She is not in acute distress.     Appearance: Normal appearance. She is normal weight. She is not ill-appearing or toxic-appearing.   HENT:      Head: Normocephalic and atraumatic.      Right Ear: External ear normal.      Left Ear: External ear normal.      Nose: Nose normal.      Mouth/Throat:      Mouth: Mucous membranes are moist.      Pharynx: Oropharynx is clear.   Eyes:      Extraocular Movements: Extraocular movements intact.      Conjunctiva/sclera: Conjunctivae normal.   Cardiovascular:      Rate and Rhythm: Normal rate and regular rhythm.      Pulses: Normal pulses.      Heart sounds: Normal heart sounds.   Pulmonary:      Effort: Pulmonary effort is normal. No respiratory distress.      Breath sounds: No stridor. No wheezing, rhonchi or rales.   Abdominal:      General: Abdomen is flat. Bowel sounds are normal.      Palpations: Abdomen is soft.   Musculoskeletal:         General: No tenderness. " Normal range of motion.      Cervical back: Normal range of motion and neck supple. No rigidity or tenderness.   Skin:     General: Skin is warm and dry.      Capillary Refill: Capillary refill takes less than 2 seconds.   Neurological:      General: No focal deficit present.      Mental Status: She is alert and oriented to person, place, and time. Mental status is at baseline.      Motor: No weakness.      Gait: Gait normal.   Psychiatric:         Mood and Affect: Mood normal.         Behavior: Behavior normal.         Thought Content: Thought content normal.         Judgment: Judgment normal.      Assessment/Plan     Mauricio Diallo is a 31 y.o.female with:    Problem List Items Addressed This Visit          Cardiac/Vascular    Elevated blood pressure reading - Primary    Overview     Strong family hx for hypertension (mom and dad, sister with hyperthyroid)  Current medication treatment:   Current Outpatient Medications on File Prior to Visit   Medication Sig Dispense Refill    JUNEL FE 1/20, 28, 1 mg-20 mcg (21)/75 mg (7) per tablet Take 1 tablet by mouth once daily.  12     No current facility-administered medications on file prior to visit.   Medication side effects: no medication side effects noted, fatigue, dry cough, swelling in ankles, weakness, orthostatic lightheadedness, myalgias, rash, nausea, abdominal discomfort, headaches, insomnia, weight gain, palpitations, slow heart rate, excessive urination, depression and wheezing  taking medications as instructed, no medication side effects noted, no TIAs, no chest pain on exertion, no dyspnea on exertion, no swelling of ankles  Exercise regimen: moderately active; three times weekly started in the past two weeks  Home BP cuff: No           Current Assessment & Plan     Start amlodipine 5 mg daily  Low sodium diet <2 g or 2 teaspoons daily  - f/u 4 weeks         Relevant Medications    amLODIPine (NORVASC) 5 MG tablet    Mixed hyperlipidemia     Overview     Diet modifications and increasing physical activity.          Current Assessment & Plan     Patient has started to cut out daily 3-4 cans of sodas and joined a gym to exercise 2-3 times per week.   - f/u 3 months            ID    Immunization declined       Endocrine    BMI 32.0-32.9,adult    Overview     Stay physically active. As tolerated alternate resistance training with stretching and cardio. Goal of 150 minutes per week of moderate intensity activity or 7,500 - 10,000 steps per day. Follow the Mediterranean Diet. Include whole fresh fruits, vegetables, olive oil, seeds, nuts, whole grains, cold water fish, salmon, mackerel and lean cuts of meat.  Do not drink sugary/diet carbonated beverages. Decrease portion sizes slightly which will result in an approximately 500-calorie deficit. Avoid fast or fried and processed food, especially canned foods. Avoid refined carbohydrates, white starchy foods, flour, white potato, bread, muffins, and cakes. Consider substituting one meal a day with a meal replacement such as Slim fast, lean cuisine, or weight watcher's. Follow a healthy diet that includes enough calcium, vitamin D and proteins for bone health.           Current Assessment & Plan                Relevant Orders    Vitamin D    Hemoglobin A1C     Other Visit Diagnoses       Weight gain        Relevant Orders    TSH    T4, Free            Other than changes above, cont current medications and maintain follow up with specialists.  Follow up for Lab review, BP check.    No future appointments.      Kazumi G Yoshinaga, DO [facility]

## 2022-12-03 LAB — 25(OH)D3+25(OH)D2 SERPL-MCNC: 24 NG/ML (ref 30–96)

## 2023-01-03 ENCOUNTER — OFFICE VISIT (OUTPATIENT)
Dept: PRIMARY CARE CLINIC | Facility: CLINIC | Age: 33
End: 2023-01-03
Payer: MEDICAID

## 2023-01-03 VITALS
HEART RATE: 93 BPM | SYSTOLIC BLOOD PRESSURE: 120 MMHG | TEMPERATURE: 98 F | RESPIRATION RATE: 14 BRPM | DIASTOLIC BLOOD PRESSURE: 81 MMHG | HEIGHT: 69 IN | OXYGEN SATURATION: 97 % | WEIGHT: 218 LBS | BODY MASS INDEX: 32.29 KG/M2

## 2023-01-03 DIAGNOSIS — E55.9 VITAMIN D INSUFFICIENCY: ICD-10-CM

## 2023-01-03 DIAGNOSIS — I10 PRIMARY HYPERTENSION: Primary | ICD-10-CM

## 2023-01-03 DIAGNOSIS — Z28.21 IMMUNIZATION DECLINED: ICD-10-CM

## 2023-01-03 DIAGNOSIS — D50.9 IRON DEFICIENCY ANEMIA, UNSPECIFIED IRON DEFICIENCY ANEMIA TYPE: ICD-10-CM

## 2023-01-03 PROCEDURE — 99214 PR OFFICE/OUTPT VISIT, EST, LEVL IV, 30-39 MIN: ICD-10-PCS | Mod: S$PBB,,, | Performed by: STUDENT IN AN ORGANIZED HEALTH CARE EDUCATION/TRAINING PROGRAM

## 2023-01-03 PROCEDURE — 3079F DIAST BP 80-89 MM HG: CPT | Mod: CPTII,,, | Performed by: STUDENT IN AN ORGANIZED HEALTH CARE EDUCATION/TRAINING PROGRAM

## 2023-01-03 PROCEDURE — 1160F RVW MEDS BY RX/DR IN RCRD: CPT | Mod: CPTII,,, | Performed by: STUDENT IN AN ORGANIZED HEALTH CARE EDUCATION/TRAINING PROGRAM

## 2023-01-03 PROCEDURE — 99213 OFFICE O/P EST LOW 20 MIN: CPT | Mod: PBBFAC,PN | Performed by: STUDENT IN AN ORGANIZED HEALTH CARE EDUCATION/TRAINING PROGRAM

## 2023-01-03 PROCEDURE — 3008F PR BODY MASS INDEX (BMI) DOCUMENTED: ICD-10-PCS | Mod: CPTII,,, | Performed by: STUDENT IN AN ORGANIZED HEALTH CARE EDUCATION/TRAINING PROGRAM

## 2023-01-03 PROCEDURE — 3074F PR MOST RECENT SYSTOLIC BLOOD PRESSURE < 130 MM HG: ICD-10-PCS | Mod: CPTII,,, | Performed by: STUDENT IN AN ORGANIZED HEALTH CARE EDUCATION/TRAINING PROGRAM

## 2023-01-03 PROCEDURE — 3079F PR MOST RECENT DIASTOLIC BLOOD PRESSURE 80-89 MM HG: ICD-10-PCS | Mod: CPTII,,, | Performed by: STUDENT IN AN ORGANIZED HEALTH CARE EDUCATION/TRAINING PROGRAM

## 2023-01-03 PROCEDURE — 99999 PR PBB SHADOW E&M-EST. PATIENT-LVL III: ICD-10-PCS | Mod: PBBFAC,,, | Performed by: STUDENT IN AN ORGANIZED HEALTH CARE EDUCATION/TRAINING PROGRAM

## 2023-01-03 PROCEDURE — 1159F PR MEDICATION LIST DOCUMENTED IN MEDICAL RECORD: ICD-10-PCS | Mod: CPTII,,, | Performed by: STUDENT IN AN ORGANIZED HEALTH CARE EDUCATION/TRAINING PROGRAM

## 2023-01-03 PROCEDURE — 3008F BODY MASS INDEX DOCD: CPT | Mod: CPTII,,, | Performed by: STUDENT IN AN ORGANIZED HEALTH CARE EDUCATION/TRAINING PROGRAM

## 2023-01-03 PROCEDURE — 1160F PR REVIEW ALL MEDS BY PRESCRIBER/CLIN PHARMACIST DOCUMENTED: ICD-10-PCS | Mod: CPTII,,, | Performed by: STUDENT IN AN ORGANIZED HEALTH CARE EDUCATION/TRAINING PROGRAM

## 2023-01-03 PROCEDURE — 99214 OFFICE O/P EST MOD 30 MIN: CPT | Mod: S$PBB,,, | Performed by: STUDENT IN AN ORGANIZED HEALTH CARE EDUCATION/TRAINING PROGRAM

## 2023-01-03 PROCEDURE — 3074F SYST BP LT 130 MM HG: CPT | Mod: CPTII,,, | Performed by: STUDENT IN AN ORGANIZED HEALTH CARE EDUCATION/TRAINING PROGRAM

## 2023-01-03 PROCEDURE — 1159F MED LIST DOCD IN RCRD: CPT | Mod: CPTII,,, | Performed by: STUDENT IN AN ORGANIZED HEALTH CARE EDUCATION/TRAINING PROGRAM

## 2023-01-03 PROCEDURE — 99999 PR PBB SHADOW E&M-EST. PATIENT-LVL III: CPT | Mod: PBBFAC,,, | Performed by: STUDENT IN AN ORGANIZED HEALTH CARE EDUCATION/TRAINING PROGRAM

## 2023-01-03 RX ORDER — FERROUS SULFATE 325(65) MG
325 TABLET, DELAYED RELEASE (ENTERIC COATED) ORAL DAILY
Qty: 30 TABLET | Refills: 2 | Status: SHIPPED | OUTPATIENT
Start: 2023-01-03 | End: 2023-04-03

## 2023-01-03 RX ORDER — ACETAMINOPHEN 500 MG
5000 TABLET ORAL DAILY
Qty: 30 TABLET | Refills: 5 | Status: SHIPPED | OUTPATIENT
Start: 2023-01-03 | End: 2023-07-02

## 2023-01-03 NOTE — PROGRESS NOTES
[facility] Note    HPI:  Mauricio Garcia is a 32 y.o. female who presents today for Follow-up (4 week fu)  32 year old female with hypertension, vitamin D insufficiency, iron deficiency anemia, prediabetes presents for follow up.     Tolerating amlodipine.   Denies fever, chills, fatigue, excessive headaches, vision changes, chest pain, palpitations, shortness of breath, abdominal pain, nausea, vomiting, leg swelling, diarrhea, constipation.      ROS   A review of systems was performed and was negative except as noted above.    I personally reviewed allergies, past medical, surgical, social and family history and updated as appropriate.    Medications:    Current Outpatient Medications:     amLODIPine (NORVASC) 5 MG tablet, Take 1 tablet (5 mg total) by mouth once daily., Disp: 30 tablet, Rfl: 11    JUNEL FE 1/20, 28, 1 mg-20 mcg (21)/75 mg (7) per tablet, Take 1 tablet by mouth once daily., Disp: , Rfl: 12    cholecalciferol, vitamin D3, (VITAMIN D3) 125 mcg (5,000 unit) Tab, Take 1 tablet (5,000 Units total) by mouth once daily., Disp: 30 tablet, Rfl: 5    ferrous sulfate 325 (65 FE) MG EC tablet, Take 1 tablet (325 mg total) by mouth once daily., Disp: 30 tablet, Rfl: 2     Health Maintenance:  Immunization History   Administered Date(s) Administered    COVID-19, MRNA, LN-S, PF (MODERNA FULL 0.5 ML DOSE) 03/08/2021, 08/16/2021    COVID-19, MRNA, LN-S, PF (Pfizer) (Purple Cap) 05/27/2021, 06/17/2021    DTP 03/30/1989, 04/05/1989, 05/25/1989, 05/31/1989, 08/02/1989, 08/24/1989, 05/30/1990, 04/13/1994    Hepatitis B 10/28/2002    Hepatitis B, Adult 12/10/2007, 05/03/2018, 06/08/2018, 11/20/2018    Hepatitis B, Pediatric/Adolescent 03/19/2003, 03/02/2005, 07/31/2006    IPV 03/30/1989, 05/25/1989    Influenza - Quadrivalent - PF *Preferred* (6 months and older) 10/09/2018, 10/29/2019, 10/06/2020, 12/22/2021, 11/07/2022    Influenza A (H1N1) 2009 Monovalent - IM 11/16/2009    MMR  "05/30/1990, 04/13/1994, 01/29/2008    OPV 04/05/1989, 05/31/1989, 05/30/1990, 04/13/1994    Td - PF (ADULT) 10/28/2002, 03/02/2005    Tdap 10/21/2016, 09/05/2017, 12/23/2021      Health Maintenance   Topic Date Due    TETANUS VACCINE  12/23/2031    Hepatitis C Screening  Completed    Lipid Panel  Completed     Health Maintenance Topics with due status: Not Due       Topic Last Completion Date    TETANUS VACCINE 12/23/2021    Cervical Cancer Screening Not Due     Health Maintenance Due   Topic Date Due    COVID-19 Vaccine (5 - Booster) 10/11/2021       PHYSICAL EXAM:  Vitals:    01/03/23 1540   BP: 120/81   BP Location: Right arm   Patient Position: Sitting   BP Method: Medium (Automatic)   Pulse: 93   Resp: 14   Temp: 98.4 °F (36.9 °C)   TempSrc: Oral   SpO2: 97%   Weight: 98.9 kg (218 lb)   Height: 5' 9" (1.753 m)     Body mass index is 32.19 kg/m².  Physical Exam  Vitals reviewed.   Constitutional:       General: She is not in acute distress.     Appearance: Normal appearance. She is normal weight. She is not ill-appearing or toxic-appearing.   HENT:      Head: Normocephalic and atraumatic.      Right Ear: External ear normal.      Left Ear: External ear normal.      Nose: Nose normal.      Mouth/Throat:      Mouth: Mucous membranes are moist.      Pharynx: Oropharynx is clear.   Eyes:      Extraocular Movements: Extraocular movements intact.      Conjunctiva/sclera: Conjunctivae normal.   Cardiovascular:      Rate and Rhythm: Normal rate and regular rhythm.      Pulses: Normal pulses.      Heart sounds: Normal heart sounds.   Pulmonary:      Effort: Pulmonary effort is normal. No respiratory distress.      Breath sounds: No stridor. No wheezing, rhonchi or rales.   Abdominal:      General: Abdomen is flat. Bowel sounds are normal.      Palpations: Abdomen is soft.   Musculoskeletal:         General: No tenderness. Normal range of motion.      Cervical back: Normal range of motion and neck supple. No rigidity or " tenderness.   Skin:     General: Skin is warm and dry.      Capillary Refill: Capillary refill takes less than 2 seconds.   Neurological:      General: No focal deficit present.      Mental Status: She is alert and oriented to person, place, and time. Mental status is at baseline.      Motor: No weakness.      Gait: Gait normal.   Psychiatric:         Mood and Affect: Mood normal.         Behavior: Behavior normal.         Thought Content: Thought content normal.         Judgment: Judgment normal.        ASSESSMENT/PLAN:  1. Primary hypertension  Overview:  Strong family hx for hypertension (mom and dad, sister with hyperthyroid)  Current medication treatment:   Current Outpatient Medications on File Prior to Visit   Medication Sig Dispense Refill    JUNEL FE 1/20, 28, 1 mg-20 mcg (21)/75 mg (7) per tablet Take 1 tablet by mouth once daily.  12     No current facility-administered medications on file prior to visit.     Medication side effects: no medication side effects noted, fatigue, dry cough, swelling in ankles, weakness, orthostatic lightheadedness, myalgias, rash, nausea, abdominal discomfort, headaches, insomnia, weight gain, palpitations, slow heart rate, excessive urination, depression and wheezing  taking medications as instructed, no medication side effects noted, no TIAs, no chest pain on exertion, no dyspnea on exertion, no swelling of ankles  Exercise regimen: moderately active; three times weekly started in the past two weeks  Home BP cuff: No      Assessment & Plan:  Normotensive. Asymptomatic. Continue with daily amlodipine. Follow low sodium diet and incorporate daily physical exercises.       2. Iron deficiency anemia, unspecified iron deficiency anemia type  Overview:  Lab Results   Component Value Date    IRON 55 09/16/2020    TIBC 305 09/16/2020    LABIRON 18 (L) 09/16/2020      Lab Results   Component Value Date    WBC 7.13 09/16/2020    HGB 10.9 (L) 09/16/2020    HCT 34.8 (L) 09/16/2020     MCV 82 09/16/2020     09/16/2020           Assessment & Plan:  Take iron supplement on empty stomach or with 4-8 ounces of orange juice about 1-2 hours before meals. Do not take with acid reducing medicines, like pepcid or pantoprazole. If you develop constipation or upset stomach, then take with food or immediately after eating.   Remember to add in diet, iron fortified cereal, eggs, lentils, chickpeas, green leafy vegetables like spinach and kale.      Orders:  -     ferrous sulfate 325 (65 FE) MG EC tablet; Take 1 tablet (325 mg total) by mouth once daily.  Dispense: 30 tablet; Refill: 2    3. Vitamin D insufficiency  Overview:  - daily 5000 IU vitamin D3  - incorporate into diet, vitamin D fortified foods, cereal, yogurt, fresh salmon, canned sardines, tuna and  mushrooms    Assessment & Plan:  - f/u vitamin D levels in 8-10 weeks  - incorporate into diet, vitamin D fortified foods, cereal, yogurt, fresh salmon, canned sardines, tuna and  mushrooms    Orders:  -     cholecalciferol, vitamin D3, (VITAMIN D3) 125 mcg (5,000 unit) Tab; Take 1 tablet (5,000 Units total) by mouth once daily.  Dispense: 30 tablet; Refill: 5    4. Immunization declined    5. BMI 32.0-32.9,adult  Overview:  Wt Readings from Last 8 Encounters:   01/03/23 98.9 kg (218 lb)   12/02/22 99.3 kg (219 lb)   10/31/22 100.2 kg (221 lb)   05/14/19 92.1 kg (203 lb)   09/18/18 92.2 kg (203 lb 4.2 oz)   05/28/18 92.5 kg (204 lb)     Stay physically active. As tolerated alternate resistance training with stretching and cardio. Goal of 150 minutes per week of moderate intensity activity or 7,500 - 10,000 steps per day. Follow the Mediterranean Diet. Include whole fresh fruits, vegetables, olive oil, seeds, nuts, whole grains, cold water fish, salmon, mackerel and lean cuts of meat.  Do not drink sugary/diet carbonated beverages. Decrease portion sizes slightly which will result in an approximately 500-calorie deficit. Avoid fast or fried and  processed food, especially canned foods. Avoid refined carbohydrates, white starchy foods, flour, white potato, bread, muffins, and cakes. Consider substituting one meal a day with a meal replacement such as Slim fast, lean cuisine, or weight watcher's. Follow a healthy diet that includes enough calcium, vitamin D and proteins for bone health.      Assessment & Plan:  Stable weight. Encouraged continued modifications to diet and increasing physical activity.           Other than changes above, continue current medications and maintain follow up with specialists.      Follow up in about 3 months (around 4/3/2023) for Annual, A1C and lipid.   Recent Results (from the past 2016 hour(s))   Hemoglobin A1C    Collection Time: 12/02/22  3:16 PM   Result Value Ref Range    Hemoglobin A1C 5.8 (H) 4.0 - 5.6 %    Estimated Avg Glucose 120 68 - 131 mg/dL   Vitamin D    Collection Time: 12/02/22  3:16 PM   Result Value Ref Range    Vit D, 25-Hydroxy 24 (L) 30 - 96 ng/mL   T4, Free    Collection Time: 12/02/22  3:16 PM   Result Value Ref Range    Free T4 0.81 0.71 - 1.51 ng/dL   TSH    Collection Time: 12/02/22  3:16 PM   Result Value Ref Range    TSH 1.070 0.400 - 4.000 uIU/mL     Kazumi G Yoshinaga, DO [facility]

## 2023-01-04 PROBLEM — E55.9 VITAMIN D INSUFFICIENCY: Status: ACTIVE | Noted: 2023-01-04

## 2023-01-04 PROBLEM — D50.9 IRON DEFICIENCY ANEMIA: Status: ACTIVE | Noted: 2023-01-04

## 2023-02-13 NOTE — ASSESSMENT & PLAN NOTE
- f/u vitamin D levels in 8-10 weeks  - incorporate into diet, vitamin D fortified foods, cereal, yogurt, fresh salmon, canned sardines, tuna and  mushrooms  
Normotensive. Asymptomatic. Continue with daily amlodipine. Follow low sodium diet and incorporate daily physical exercises.   
Stable weight. Encouraged continued modifications to diet and increasing physical activity.   
Take iron supplement on empty stomach or with 4-8 ounces of orange juice about 1-2 hours before meals. Do not take with acid reducing medicines, like pepcid or pantoprazole. If you develop constipation or upset stomach, then take with food or immediately after eating.   Remember to add in diet, iron fortified cereal, eggs, lentils, chickpeas, green leafy vegetables like spinach and kale.    
99

## 2023-07-07 ENCOUNTER — OFFICE VISIT (OUTPATIENT)
Dept: PRIMARY CARE CLINIC | Facility: CLINIC | Age: 33
End: 2023-07-07
Payer: MEDICAID

## 2023-07-07 VITALS
RESPIRATION RATE: 16 BRPM | WEIGHT: 216 LBS | BODY MASS INDEX: 31.99 KG/M2 | OXYGEN SATURATION: 95 % | TEMPERATURE: 98 F | HEIGHT: 69 IN | HEART RATE: 86 BPM | DIASTOLIC BLOOD PRESSURE: 78 MMHG | SYSTOLIC BLOOD PRESSURE: 129 MMHG

## 2023-07-07 DIAGNOSIS — Z28.21 IMMUNIZATION DECLINED: ICD-10-CM

## 2023-07-07 DIAGNOSIS — E78.2 MIXED HYPERLIPIDEMIA: ICD-10-CM

## 2023-07-07 DIAGNOSIS — E55.9 VITAMIN D INSUFFICIENCY: ICD-10-CM

## 2023-07-07 DIAGNOSIS — D50.9 IRON DEFICIENCY ANEMIA, UNSPECIFIED IRON DEFICIENCY ANEMIA TYPE: ICD-10-CM

## 2023-07-07 DIAGNOSIS — Z11.3 ROUTINE SCREENING FOR STI (SEXUALLY TRANSMITTED INFECTION): ICD-10-CM

## 2023-07-07 DIAGNOSIS — I10 PRIMARY HYPERTENSION: Primary | ICD-10-CM

## 2023-07-07 LAB
ALBUMIN SERPL BCP-MCNC: 3.4 G/DL (ref 3.5–5.2)
ALP SERPL-CCNC: 48 U/L (ref 55–135)
ALT SERPL W/O P-5'-P-CCNC: 20 U/L (ref 10–44)
ANION GAP SERPL CALC-SCNC: 4 MMOL/L (ref 8–16)
AST SERPL-CCNC: 17 U/L (ref 10–40)
BASOPHILS # BLD AUTO: 0.02 K/UL (ref 0–0.2)
BASOPHILS NFR BLD: 0.3 % (ref 0–1.9)
BILIRUB SERPL-MCNC: 0.2 MG/DL (ref 0.1–1)
BUN SERPL-MCNC: 7 MG/DL (ref 6–20)
CALCIUM SERPL-MCNC: 8.8 MG/DL (ref 8.7–10.5)
CHLORIDE SERPL-SCNC: 107 MMOL/L (ref 95–110)
CHOLEST SERPL-MCNC: 234 MG/DL (ref 120–199)
CHOLEST/HDLC SERPL: 6.7 {RATIO} (ref 2–5)
CO2 SERPL-SCNC: 27 MMOL/L (ref 23–29)
CREAT SERPL-MCNC: 0.7 MG/DL (ref 0.5–1.4)
DIFFERENTIAL METHOD: ABNORMAL
EOSINOPHIL # BLD AUTO: 0.1 K/UL (ref 0–0.5)
EOSINOPHIL NFR BLD: 1.6 % (ref 0–8)
ERYTHROCYTE [DISTWIDTH] IN BLOOD BY AUTOMATED COUNT: 13.5 % (ref 11.5–14.5)
EST. GFR  (NO RACE VARIABLE): >60 ML/MIN/1.73 M^2
FERRITIN SERPL-MCNC: 29 NG/ML (ref 20–300)
GLUCOSE SERPL-MCNC: 100 MG/DL (ref 70–110)
HCT VFR BLD AUTO: 38.6 % (ref 37–48.5)
HDLC SERPL-MCNC: 35 MG/DL (ref 40–75)
HDLC SERPL: 15 % (ref 20–50)
HGB BLD-MCNC: 12.1 G/DL (ref 12–16)
IMM GRANULOCYTES # BLD AUTO: 0.01 K/UL (ref 0–0.04)
IMM GRANULOCYTES NFR BLD AUTO: 0.1 % (ref 0–0.5)
IRON SATN MFR SERPL: 15 % (ref 20–50)
IRON SERPL-MCNC: 45 UG/DL (ref 30–160)
LDLC SERPL CALC-MCNC: 162.6 MG/DL (ref 63–159)
LYMPHOCYTES # BLD AUTO: 2.4 K/UL (ref 1–4.8)
LYMPHOCYTES NFR BLD: 33.5 % (ref 18–48)
MCH RBC QN AUTO: 25.1 PG (ref 27–31)
MCHC RBC AUTO-ENTMCNC: 31.3 G/DL (ref 32–36)
MCV RBC AUTO: 80 FL (ref 82–98)
MONOCYTES # BLD AUTO: 0.4 K/UL (ref 0.3–1)
MONOCYTES NFR BLD: 5.7 % (ref 4–15)
NEUTROPHILS # BLD AUTO: 4.1 K/UL (ref 1.8–7.7)
NEUTROPHILS NFR BLD: 58.8 % (ref 38–73)
NONHDLC SERPL-MCNC: 199 MG/DL
NRBC BLD-RTO: 0 /100 WBC
PLATELET # BLD AUTO: 273 K/UL (ref 150–450)
PMV BLD AUTO: 9.2 FL (ref 9.2–12.9)
POTASSIUM SERPL-SCNC: 3.6 MMOL/L (ref 3.5–5.1)
PROT SERPL-MCNC: 8.2 G/DL (ref 6–8.4)
RBC # BLD AUTO: 4.82 M/UL (ref 4–5.4)
SODIUM SERPL-SCNC: 138 MMOL/L (ref 136–145)
TOTAL IRON BINDING CAPACITY: 309 UG/DL (ref 250–450)
TRIGL SERPL-MCNC: 182 MG/DL (ref 30–150)
VIT B12 SERPL-MCNC: 1020 PG/ML (ref 210–950)
WBC # BLD AUTO: 7.02 K/UL (ref 3.9–12.7)

## 2023-07-07 PROCEDURE — 99999 PR PBB SHADOW E&M-EST. PATIENT-LVL IV: CPT | Mod: PBBFAC,,, | Performed by: STUDENT IN AN ORGANIZED HEALTH CARE EDUCATION/TRAINING PROGRAM

## 2023-07-07 PROCEDURE — 3008F PR BODY MASS INDEX (BMI) DOCUMENTED: ICD-10-PCS | Mod: CPTII,,, | Performed by: STUDENT IN AN ORGANIZED HEALTH CARE EDUCATION/TRAINING PROGRAM

## 2023-07-07 PROCEDURE — 85025 COMPLETE CBC W/AUTO DIFF WBC: CPT | Performed by: STUDENT IN AN ORGANIZED HEALTH CARE EDUCATION/TRAINING PROGRAM

## 2023-07-07 PROCEDURE — 1160F PR REVIEW ALL MEDS BY PRESCRIBER/CLIN PHARMACIST DOCUMENTED: ICD-10-PCS | Mod: CPTII,,, | Performed by: STUDENT IN AN ORGANIZED HEALTH CARE EDUCATION/TRAINING PROGRAM

## 2023-07-07 PROCEDURE — 82607 VITAMIN B-12: CPT | Performed by: STUDENT IN AN ORGANIZED HEALTH CARE EDUCATION/TRAINING PROGRAM

## 2023-07-07 PROCEDURE — 99214 PR OFFICE/OUTPT VISIT, EST, LEVL IV, 30-39 MIN: ICD-10-PCS | Mod: S$PBB,,, | Performed by: STUDENT IN AN ORGANIZED HEALTH CARE EDUCATION/TRAINING PROGRAM

## 2023-07-07 PROCEDURE — 3078F PR MOST RECENT DIASTOLIC BLOOD PRESSURE < 80 MM HG: ICD-10-PCS | Mod: CPTII,,, | Performed by: STUDENT IN AN ORGANIZED HEALTH CARE EDUCATION/TRAINING PROGRAM

## 2023-07-07 PROCEDURE — 99214 OFFICE O/P EST MOD 30 MIN: CPT | Mod: S$PBB,,, | Performed by: STUDENT IN AN ORGANIZED HEALTH CARE EDUCATION/TRAINING PROGRAM

## 2023-07-07 PROCEDURE — 87389 HIV-1 AG W/HIV-1&-2 AB AG IA: CPT | Performed by: STUDENT IN AN ORGANIZED HEALTH CARE EDUCATION/TRAINING PROGRAM

## 2023-07-07 PROCEDURE — 3078F DIAST BP <80 MM HG: CPT | Mod: CPTII,,, | Performed by: STUDENT IN AN ORGANIZED HEALTH CARE EDUCATION/TRAINING PROGRAM

## 2023-07-07 PROCEDURE — 83550 IRON BINDING TEST: CPT | Performed by: STUDENT IN AN ORGANIZED HEALTH CARE EDUCATION/TRAINING PROGRAM

## 2023-07-07 PROCEDURE — 82728 ASSAY OF FERRITIN: CPT | Performed by: STUDENT IN AN ORGANIZED HEALTH CARE EDUCATION/TRAINING PROGRAM

## 2023-07-07 PROCEDURE — 1159F PR MEDICATION LIST DOCUMENTED IN MEDICAL RECORD: ICD-10-PCS | Mod: CPTII,,, | Performed by: STUDENT IN AN ORGANIZED HEALTH CARE EDUCATION/TRAINING PROGRAM

## 2023-07-07 PROCEDURE — 99214 OFFICE O/P EST MOD 30 MIN: CPT | Mod: PBBFAC,PN | Performed by: STUDENT IN AN ORGANIZED HEALTH CARE EDUCATION/TRAINING PROGRAM

## 2023-07-07 PROCEDURE — 1159F MED LIST DOCD IN RCRD: CPT | Mod: CPTII,,, | Performed by: STUDENT IN AN ORGANIZED HEALTH CARE EDUCATION/TRAINING PROGRAM

## 2023-07-07 PROCEDURE — 1160F RVW MEDS BY RX/DR IN RCRD: CPT | Mod: CPTII,,, | Performed by: STUDENT IN AN ORGANIZED HEALTH CARE EDUCATION/TRAINING PROGRAM

## 2023-07-07 PROCEDURE — 86803 HEPATITIS C AB TEST: CPT | Performed by: STUDENT IN AN ORGANIZED HEALTH CARE EDUCATION/TRAINING PROGRAM

## 2023-07-07 PROCEDURE — 80061 LIPID PANEL: CPT | Performed by: STUDENT IN AN ORGANIZED HEALTH CARE EDUCATION/TRAINING PROGRAM

## 2023-07-07 PROCEDURE — 3008F BODY MASS INDEX DOCD: CPT | Mod: CPTII,,, | Performed by: STUDENT IN AN ORGANIZED HEALTH CARE EDUCATION/TRAINING PROGRAM

## 2023-07-07 PROCEDURE — 3074F PR MOST RECENT SYSTOLIC BLOOD PRESSURE < 130 MM HG: ICD-10-PCS | Mod: CPTII,,, | Performed by: STUDENT IN AN ORGANIZED HEALTH CARE EDUCATION/TRAINING PROGRAM

## 2023-07-07 PROCEDURE — 83540 ASSAY OF IRON: CPT | Performed by: STUDENT IN AN ORGANIZED HEALTH CARE EDUCATION/TRAINING PROGRAM

## 2023-07-07 PROCEDURE — 3074F SYST BP LT 130 MM HG: CPT | Mod: CPTII,,, | Performed by: STUDENT IN AN ORGANIZED HEALTH CARE EDUCATION/TRAINING PROGRAM

## 2023-07-07 PROCEDURE — 80053 COMPREHEN METABOLIC PANEL: CPT | Performed by: STUDENT IN AN ORGANIZED HEALTH CARE EDUCATION/TRAINING PROGRAM

## 2023-07-07 PROCEDURE — 99999 PR PBB SHADOW E&M-EST. PATIENT-LVL IV: ICD-10-PCS | Mod: PBBFAC,,, | Performed by: STUDENT IN AN ORGANIZED HEALTH CARE EDUCATION/TRAINING PROGRAM

## 2023-07-07 PROCEDURE — 86592 SYPHILIS TEST NON-TREP QUAL: CPT | Performed by: STUDENT IN AN ORGANIZED HEALTH CARE EDUCATION/TRAINING PROGRAM

## 2023-07-07 NOTE — ASSESSMENT & PLAN NOTE
- incorporate into diet, vitamin D fortified foods, cereal, yogurt, fresh salmon, canned sardines, tuna and  mushrooms

## 2023-07-07 NOTE — PROGRESS NOTES
[facility] Note    HPI:  Mauricio Garcia is a 32 y.o. female who presents today for Hypertension (6 month follow up visit)    32 year old female with hypertension, vitamin D insufficiency, iron deficiency anemia, prediabetes presents for follow up.   Denies fever, chills, fatigue, excessive headaches, vision changes, chest pain, palpitations, shortness of breath, abdominal pain, nausea, vomiting, leg swelling, diarrhea, constipation.     ROS   A review of systems was performed and was negative except as noted above.    I personally reviewed allergies, past medical, surgical, social and family history and updated as appropriate.    Medications:    Current Outpatient Medications:     amLODIPine (NORVASC) 5 MG tablet, Take 1 tablet (5 mg total) by mouth once daily., Disp: 30 tablet, Rfl: 11    JUNEL FE 1/20, 28, 1 mg-20 mcg (21)/75 mg (7) per tablet, Take 1 tablet by mouth once daily., Disp: , Rfl: 12     Health Maintenance:  Immunization History   Administered Date(s) Administered    COVID-19, MRNA, LN-S, PF (MODERNA FULL 0.5 ML DOSE) 03/08/2021, 08/16/2021    COVID-19, MRNA, LN-S, PF (Pfizer) (Purple Cap) 05/27/2021, 06/17/2021    DTP 03/30/1989, 04/05/1989, 05/25/1989, 05/31/1989, 08/02/1989, 08/24/1989, 05/30/1990, 04/13/1994    Hepatitis B 10/28/2002    Hepatitis B, Adult 12/10/2007, 05/03/2018, 06/08/2018, 11/20/2018    Hepatitis B, Pediatric/Adolescent 03/19/2003, 03/02/2005, 07/31/2006    IPV 03/30/1989, 05/25/1989    Influenza - Quadrivalent - PF *Preferred* (6 months and older) 10/09/2018, 10/29/2019, 10/06/2020, 12/22/2021, 11/07/2022    Influenza A (H1N1) 2009 Monovalent - IM 11/16/2009    MMR 05/30/1990, 04/13/1994, 01/29/2008    OPV 04/05/1989, 05/31/1989, 05/30/1990, 04/13/1994    Td - PF (ADULT) 10/28/2002, 03/02/2005    Tdap 10/21/2016, 09/05/2017, 12/23/2021      Health Maintenance   Topic Date Due    TETANUS VACCINE  12/23/2031    Hepatitis C Screening  Completed    Lipid  "Panel  Completed     Health Maintenance Topics with due status: Not Due       Topic Last Completion Date    TETANUS VACCINE 12/23/2021    Cervical Cancer Screening 04/27/2022    Influenza Vaccine 11/07/2022     Health Maintenance Due   Topic Date Due    COVID-19 Vaccine (5 - Mixed Product series) 10/11/2021     PHYSICAL EXAM:  Vitals:    07/07/23 1354   BP: 129/78   BP Location: Left arm   Patient Position: Sitting   BP Method: Large (Automatic)   Pulse: 86   Resp: 16   Temp: 98.4 °F (36.9 °C)   TempSrc: Oral   SpO2: 95%   Weight: 98 kg (216 lb)   Height: 5' 9" (1.753 m)     Body mass index is 31.9 kg/m².  Physical Exam  Vitals reviewed.   Constitutional:       General: She is not in acute distress.     Appearance: Normal appearance. She is normal weight. She is not ill-appearing or toxic-appearing.   HENT:      Head: Normocephalic and atraumatic.      Right Ear: External ear normal.      Left Ear: External ear normal.      Nose: Nose normal.      Mouth/Throat:      Mouth: Mucous membranes are moist.      Pharynx: Oropharynx is clear.   Eyes:      Extraocular Movements: Extraocular movements intact.      Conjunctiva/sclera: Conjunctivae normal.   Cardiovascular:      Rate and Rhythm: Normal rate and regular rhythm.      Pulses: Normal pulses.      Heart sounds: Normal heart sounds.   Pulmonary:      Effort: Pulmonary effort is normal. No respiratory distress.      Breath sounds: No stridor. No wheezing, rhonchi or rales.   Abdominal:      General: Abdomen is flat.      Palpations: Abdomen is soft.      Tenderness: There is no right CVA tenderness or left CVA tenderness.   Musculoskeletal:         General: No tenderness. Normal range of motion.      Cervical back: Normal range of motion and neck supple. No rigidity or tenderness.      Right lower leg: No edema.      Left lower leg: No edema.   Skin:     General: Skin is warm and dry.      Capillary Refill: Capillary refill takes less than 2 seconds.   Neurological: "      General: No focal deficit present.      Mental Status: She is alert and oriented to person, place, and time. Mental status is at baseline.      Motor: No weakness.      Gait: Gait normal.   Psychiatric:         Mood and Affect: Mood normal.         Behavior: Behavior normal.         Thought Content: Thought content normal.         Judgment: Judgment normal.      ASSESSMENT/PLAN:  1. Primary hypertension  Overview:  Strong family hx for hypertension (mom and dad, sister with hyperthyroid)  Current medication treatment:   Current Outpatient Medications on File Prior to Visit   Medication Sig Dispense Refill    JUNEL FE 1/20, 28, 1 mg-20 mcg (21)/75 mg (7) per tablet Take 1 tablet by mouth once daily.  12     No current facility-administered medications on file prior to visit.     Medication side effects: no medication side effects noted, fatigue, dry cough, swelling in ankles, weakness, orthostatic lightheadedness, myalgias, rash, nausea, abdominal discomfort, headaches, insomnia, weight gain, palpitations, slow heart rate, excessive urination, depression and wheezing  taking medications as instructed, no medication side effects noted, no TIAs, no chest pain on exertion, no dyspnea on exertion, no swelling of ankles  Exercise regimen: moderately active; three times weekly started in the past two weeks  Home BP cuff: No      Assessment & Plan:  Normotensive. Asymptomatic. Continue with daily amlodipine. Follow low sodium diet and incorporate daily physical exercises.     Orders:  -     Comprehensive Metabolic Panel; Future; Expected date: 07/07/2023    2. BMI 32.0-32.9,adult  Overview:  Wt Readings from Last 8 Encounters:   07/07/23 98 kg (216 lb)   01/03/23 98.9 kg (218 lb)   12/02/22 99.3 kg (219 lb)   10/31/22 100.2 kg (221 lb)   05/14/19 92.1 kg (203 lb)   09/18/18 92.2 kg (203 lb 4.2 oz)   05/28/18 92.5 kg (204 lb)         Assessment & Plan:  Stable weight. Stay physically active. As tolerated alternate  resistance training with stretching and cardio. Goal of 150 minutes per week of moderate intensity activity or 7,500 - 10,000 steps per day. Follow the Mediterranean Diet. Include whole fresh fruits, vegetables, olive oil, seeds, nuts, whole grains, cold water fish, salmon, mackerel and lean cuts of meat.  Do not drink sugary/diet carbonated beverages. Decrease portion sizes slightly which will result in an approximately 500-calorie deficit. Avoid fast or fried and processed food, especially canned foods. Avoid refined carbohydrates, white starchy foods, flour, white potato, bread, muffins, and cakes. Consider substituting one meal a day with a meal replacement such as Slim fast, lean cuisine, or weight watcher's. Follow a healthy diet that includes enough calcium, vitamin D and proteins for bone health.        3. Mixed hyperlipidemia  Overview:  Diet modifications and increasing physical activity.     Assessment & Plan:  Patient has started to cut out daily 3-4 cans of sodas and joined a gym to exercise 2-3 times per week.   - f/u lipid panel          Orders:  -     Lipid Panel; Future; Expected date: 07/07/2023    4. Immunization declined    5. Iron deficiency anemia, unspecified iron deficiency anemia type  Overview:  Lab Results   Component Value Date    IRON 55 09/16/2020    TIBC 305 09/16/2020    LABIRON 18 (L) 09/16/2020      Lab Results   Component Value Date    WBC 7.13 09/16/2020    HGB 10.9 (L) 09/16/2020    HCT 34.8 (L) 09/16/2020    MCV 82 09/16/2020     09/16/2020           Assessment & Plan:  Take iron supplement on empty stomach or with 4-8 ounces of orange juice about 1-2 hours before meals. Do not take with acid reducing medicines, like pepcid or pantoprazole. If you develop constipation or upset stomach, then take with food or immediately after eating.   Remember to add in diet, iron fortified cereal, eggs, lentils, chickpeas, green leafy vegetables like spinach and kale.  - f/u  CBC    Orders:  -     CBC Auto Differential; Future; Expected date: 07/07/2023  -     Iron and TIBC; Future; Expected date: 07/07/2023  -     Ferritin; Future; Expected date: 07/07/2023  -     Vitamin B12; Future; Expected date: 07/07/2023    6. Routine screening for STI (sexually transmitted infection)  -     HIV 1/2 Ag/Ab (4th Gen); Future; Expected date: 07/07/2023  -     Hepatitis C Antibody; Future; Expected date: 07/07/2023  -     RPR; Future; Expected date: 07/07/2023    7. Vitamin D insufficiency  Overview:  - daily 5000 IU vitamin D3  - incorporate into diet, vitamin D fortified foods, cereal, yogurt, fresh salmon, canned sardines, tuna and  mushrooms    Assessment & Plan:  - incorporate into diet, vitamin D fortified foods, cereal, yogurt, fresh salmon, canned sardines, tuna and  mushrooms          Other than changes above, continue current medications and maintain follow up with specialists.      Follow up in about 6 months (around 1/7/2024) for BP check, Med recheck, Annual.   Recent Results (from the past 2016 hour(s))   Vitamin B12    Collection Time: 07/07/23  2:53 PM   Result Value Ref Range    Vitamin B-12 1020 (H) 210 - 950 pg/mL   Ferritin    Collection Time: 07/07/23  2:53 PM   Result Value Ref Range    Ferritin 29 20.0 - 300.0 ng/mL   Iron and TIBC    Collection Time: 07/07/23  2:53 PM   Result Value Ref Range    Iron 45 30 - 160 ug/dL    TIBC 309 250 - 450 ug/dL    Iron Saturation 15 (L) 20 - 50 %   RPR    Collection Time: 07/07/23  2:53 PM   Result Value Ref Range    RPR Non-reactive Non-reactive   Hepatitis C Antibody    Collection Time: 07/07/23  2:53 PM   Result Value Ref Range    Hepatitis C Ab Non-reactive Non-reactive   HIV 1/2 Ag/Ab (4th Gen)    Collection Time: 07/07/23  2:53 PM   Result Value Ref Range    HIV 1/2 Ag/Ab Non-reactive Non-reactive   Lipid Panel    Collection Time: 07/07/23  2:53 PM   Result Value Ref Range    Cholesterol 234 (H) 120 - 199 mg/dL    Triglycerides 182 (H)  30 - 150 mg/dL    HDL 35 (L) 40 - 75 mg/dL    LDL Cholesterol 162.6 (H) 63.0 - 159.0 mg/dL    HDL/Cholesterol Ratio 15.0 (L) 20.0 - 50.0 %    Total Cholesterol/HDL Ratio 6.7 (H) 2.0 - 5.0    Non-HDL Cholesterol 199 mg/dL   Comprehensive Metabolic Panel    Collection Time: 07/07/23  2:53 PM   Result Value Ref Range    Sodium 138 136 - 145 mmol/L    Potassium 3.6 3.5 - 5.1 mmol/L    Chloride 107 95 - 110 mmol/L    CO2 27 23 - 29 mmol/L    Glucose 100 70 - 110 mg/dL    BUN 7 6 - 20 mg/dL    Creatinine 0.7 0.5 - 1.4 mg/dL    Calcium 8.8 8.7 - 10.5 mg/dL    Total Protein 8.2 6.0 - 8.4 g/dL    Albumin 3.4 (L) 3.5 - 5.2 g/dL    Total Bilirubin 0.2 0.1 - 1.0 mg/dL    Alkaline Phosphatase 48 (L) 55 - 135 U/L    AST 17 10 - 40 U/L    ALT 20 10 - 44 U/L    eGFR >60.0 >60 mL/min/1.73 m^2    Anion Gap 4 (L) 8 - 16 mmol/L   CBC Auto Differential    Collection Time: 07/07/23  2:53 PM   Result Value Ref Range    WBC 7.02 3.90 - 12.70 K/uL    RBC 4.82 4.00 - 5.40 M/uL    Hemoglobin 12.1 12.0 - 16.0 g/dL    Hematocrit 38.6 37.0 - 48.5 %    MCV 80 (L) 82 - 98 fL    MCH 25.1 (L) 27.0 - 31.0 pg    MCHC 31.3 (L) 32.0 - 36.0 g/dL    RDW 13.5 11.5 - 14.5 %    Platelets 273 150 - 450 K/uL    MPV 9.2 9.2 - 12.9 fL    Immature Granulocytes 0.1 0.0 - 0.5 %    Gran # (ANC) 4.1 1.8 - 7.7 K/uL    Immature Grans (Abs) 0.01 0.00 - 0.04 K/uL    Lymph # 2.4 1.0 - 4.8 K/uL    Mono # 0.4 0.3 - 1.0 K/uL    Eos # 0.1 0.0 - 0.5 K/uL    Baso # 0.02 0.00 - 0.20 K/uL    nRBC 0 0 /100 WBC    Gran % 58.8 38.0 - 73.0 %    Lymph % 33.5 18.0 - 48.0 %    Mono % 5.7 4.0 - 15.0 %    Eosinophil % 1.6 0.0 - 8.0 %    Basophil % 0.3 0.0 - 1.9 %    Differential Method Automated        Erum Gomez DO  [facility]

## 2023-07-07 NOTE — ASSESSMENT & PLAN NOTE
Patient has started to cut out daily 3-4 cans of sodas and joined a gym to exercise 2-3 times per week.   - f/u lipid panel

## 2023-07-07 NOTE — ASSESSMENT & PLAN NOTE
Stable weight. Stay physically active. As tolerated alternate resistance training with stretching and cardio. Goal of 150 minutes per week of moderate intensity activity or 7,500 - 10,000 steps per day. Follow the Mediterranean Diet. Include whole fresh fruits, vegetables, olive oil, seeds, nuts, whole grains, cold water fish, salmon, mackerel and lean cuts of meat.  Do not drink sugary/diet carbonated beverages. Decrease portion sizes slightly which will result in an approximately 500-calorie deficit. Avoid fast or fried and processed food, especially canned foods. Avoid refined carbohydrates, white starchy foods, flour, white potato, bread, muffins, and cakes. Consider substituting one meal a day with a meal replacement such as Slim fast, lean cuisine, or weight watcher's. Follow a healthy diet that includes enough calcium, vitamin D and proteins for bone health.

## 2023-07-07 NOTE — ASSESSMENT & PLAN NOTE
Take iron supplement on empty stomach or with 4-8 ounces of orange juice about 1-2 hours before meals. Do not take with acid reducing medicines, like pepcid or pantoprazole. If you develop constipation or upset stomach, then take with food or immediately after eating.   Remember to add in diet, iron fortified cereal, eggs, lentils, chickpeas, green leafy vegetables like spinach and kale.  - f/u CBC

## 2023-07-07 NOTE — ASSESSMENT & PLAN NOTE
Normotensive. Asymptomatic. Continue with daily amlodipine. Follow low sodium diet and incorporate daily physical exercises.

## 2023-07-08 LAB
HCV AB SERPL QL IA: NORMAL
HIV 1+2 AB+HIV1 P24 AG SERPL QL IA: NORMAL
RPR SER QL: NORMAL

## 2023-07-14 ENCOUNTER — OFFICE VISIT (OUTPATIENT)
Dept: PRIMARY CARE CLINIC | Facility: CLINIC | Age: 33
End: 2023-07-14
Payer: MEDICAID

## 2023-07-14 VITALS
WEIGHT: 217 LBS | DIASTOLIC BLOOD PRESSURE: 88 MMHG | HEART RATE: 85 BPM | HEIGHT: 69 IN | RESPIRATION RATE: 16 BRPM | OXYGEN SATURATION: 96 % | TEMPERATURE: 99 F | BODY MASS INDEX: 32.14 KG/M2 | SYSTOLIC BLOOD PRESSURE: 125 MMHG

## 2023-07-14 DIAGNOSIS — E78.2 MIXED HYPERLIPIDEMIA: ICD-10-CM

## 2023-07-14 DIAGNOSIS — D50.9 IRON DEFICIENCY ANEMIA, UNSPECIFIED IRON DEFICIENCY ANEMIA TYPE: ICD-10-CM

## 2023-07-14 DIAGNOSIS — D64.9 ANEMIA, UNSPECIFIED TYPE: Primary | ICD-10-CM

## 2023-07-14 DIAGNOSIS — E66.9 CLASS 1 OBESITY WITHOUT SERIOUS COMORBIDITY WITH BODY MASS INDEX (BMI) OF 32.0 TO 32.9 IN ADULT, UNSPECIFIED OBESITY TYPE: ICD-10-CM

## 2023-07-14 DIAGNOSIS — I10 PRIMARY HYPERTENSION: ICD-10-CM

## 2023-07-14 DIAGNOSIS — E55.9 VITAMIN D INSUFFICIENCY: ICD-10-CM

## 2023-07-14 PROCEDURE — 1160F RVW MEDS BY RX/DR IN RCRD: CPT | Mod: CPTII,,, | Performed by: STUDENT IN AN ORGANIZED HEALTH CARE EDUCATION/TRAINING PROGRAM

## 2023-07-14 PROCEDURE — 99999 PR PBB SHADOW E&M-EST. PATIENT-LVL IV: CPT | Mod: PBBFAC,,, | Performed by: STUDENT IN AN ORGANIZED HEALTH CARE EDUCATION/TRAINING PROGRAM

## 2023-07-14 PROCEDURE — 99213 OFFICE O/P EST LOW 20 MIN: CPT | Mod: S$PBB,,, | Performed by: STUDENT IN AN ORGANIZED HEALTH CARE EDUCATION/TRAINING PROGRAM

## 2023-07-14 PROCEDURE — 3008F BODY MASS INDEX DOCD: CPT | Mod: CPTII,,, | Performed by: STUDENT IN AN ORGANIZED HEALTH CARE EDUCATION/TRAINING PROGRAM

## 2023-07-14 PROCEDURE — 3074F PR MOST RECENT SYSTOLIC BLOOD PRESSURE < 130 MM HG: ICD-10-PCS | Mod: CPTII,,, | Performed by: STUDENT IN AN ORGANIZED HEALTH CARE EDUCATION/TRAINING PROGRAM

## 2023-07-14 PROCEDURE — 1160F PR REVIEW ALL MEDS BY PRESCRIBER/CLIN PHARMACIST DOCUMENTED: ICD-10-PCS | Mod: CPTII,,, | Performed by: STUDENT IN AN ORGANIZED HEALTH CARE EDUCATION/TRAINING PROGRAM

## 2023-07-14 PROCEDURE — 3074F SYST BP LT 130 MM HG: CPT | Mod: CPTII,,, | Performed by: STUDENT IN AN ORGANIZED HEALTH CARE EDUCATION/TRAINING PROGRAM

## 2023-07-14 PROCEDURE — 99214 OFFICE O/P EST MOD 30 MIN: CPT | Mod: PBBFAC,PN | Performed by: STUDENT IN AN ORGANIZED HEALTH CARE EDUCATION/TRAINING PROGRAM

## 2023-07-14 PROCEDURE — 99999 PR PBB SHADOW E&M-EST. PATIENT-LVL IV: ICD-10-PCS | Mod: PBBFAC,,, | Performed by: STUDENT IN AN ORGANIZED HEALTH CARE EDUCATION/TRAINING PROGRAM

## 2023-07-14 PROCEDURE — 3008F PR BODY MASS INDEX (BMI) DOCUMENTED: ICD-10-PCS | Mod: CPTII,,, | Performed by: STUDENT IN AN ORGANIZED HEALTH CARE EDUCATION/TRAINING PROGRAM

## 2023-07-14 PROCEDURE — 3079F DIAST BP 80-89 MM HG: CPT | Mod: CPTII,,, | Performed by: STUDENT IN AN ORGANIZED HEALTH CARE EDUCATION/TRAINING PROGRAM

## 2023-07-14 PROCEDURE — 1159F MED LIST DOCD IN RCRD: CPT | Mod: CPTII,,, | Performed by: STUDENT IN AN ORGANIZED HEALTH CARE EDUCATION/TRAINING PROGRAM

## 2023-07-14 PROCEDURE — 99213 PR OFFICE/OUTPT VISIT, EST, LEVL III, 20-29 MIN: ICD-10-PCS | Mod: S$PBB,,, | Performed by: STUDENT IN AN ORGANIZED HEALTH CARE EDUCATION/TRAINING PROGRAM

## 2023-07-14 PROCEDURE — 3079F PR MOST RECENT DIASTOLIC BLOOD PRESSURE 80-89 MM HG: ICD-10-PCS | Mod: CPTII,,, | Performed by: STUDENT IN AN ORGANIZED HEALTH CARE EDUCATION/TRAINING PROGRAM

## 2023-07-14 PROCEDURE — 1159F PR MEDICATION LIST DOCUMENTED IN MEDICAL RECORD: ICD-10-PCS | Mod: CPTII,,, | Performed by: STUDENT IN AN ORGANIZED HEALTH CARE EDUCATION/TRAINING PROGRAM

## 2023-07-14 RX ORDER — FERROUS SULFATE 325(65) MG
325 TABLET ORAL DAILY
Qty: 30 TABLET | Refills: 5 | Status: SHIPPED | OUTPATIENT
Start: 2023-07-14 | End: 2024-01-10

## 2023-07-14 NOTE — PROGRESS NOTES
[facility] Note    HPI:  Mauricio Garcia is a 32 y.o. female who presents today for Hypertension (BP check and lab review  elevated cholesterol profile and iron deficiency anemia )    32 year old female with hypertension, vitamin D insufficiency, iron deficiency anemia, prediabetes presents for follow up.   Denies fever, chills, fatigue, excessive headaches, vision changes, chest pain, palpitations, shortness of breath, abdominal pain, nausea, vomiting, leg swelling, diarrhea, constipation.      ROS   A review of systems was performed and was negative except as noted above.    I personally reviewed allergies, past medical, surgical, social and family history and updated as appropriate.    Medications:    Current Outpatient Medications:     amLODIPine (NORVASC) 5 MG tablet, Take 1 tablet (5 mg total) by mouth once daily., Disp: 30 tablet, Rfl: 11    JUNEL FE 1/20, 28, 1 mg-20 mcg (21)/75 mg (7) per tablet, Take 1 tablet by mouth once daily., Disp: , Rfl: 12    ferrous sulfate (FEOSOL) 325 mg (65 mg iron) Tab tablet, Take 1 tablet (325 mg total) by mouth once daily., Disp: 30 tablet, Rfl: 5     Health Maintenance:  Immunization History   Administered Date(s) Administered    COVID-19, MRNA, LN-S, PF (MODERNA FULL 0.5 ML DOSE) 03/08/2021, 08/16/2021    COVID-19, MRNA, LN-S, PF (Pfizer) (Purple Cap) 05/27/2021, 06/17/2021    DTP 03/30/1989, 04/05/1989, 05/25/1989, 05/31/1989, 08/02/1989, 08/24/1989, 05/30/1990, 04/13/1994    Hepatitis B 10/28/2002    Hepatitis B, Adult 12/10/2007, 05/03/2018, 06/08/2018, 11/20/2018    Hepatitis B, Pediatric/Adolescent 03/19/2003, 03/02/2005, 07/31/2006    IPV 03/30/1989, 05/25/1989    Influenza - Quadrivalent - PF *Preferred* (6 months and older) 10/09/2018, 10/29/2019, 10/06/2020, 12/22/2021, 11/07/2022    Influenza A (H1N1) 2009 Monovalent - IM 11/16/2009    MMR 05/30/1990, 04/13/1994, 01/29/2008    OPV 04/05/1989, 05/31/1989, 05/30/1990, 04/13/1994    Td -  "PF (ADULT) 10/28/2002, 03/02/2005    Tdap 10/21/2016, 09/05/2017, 12/23/2021      Health Maintenance   Topic Date Due    TETANUS VACCINE  12/23/2031    Hepatitis C Screening  Completed    Lipid Panel  Completed     Health Maintenance Topics with due status: Not Due       Topic Last Completion Date    TETANUS VACCINE 12/23/2021    Cervical Cancer Screening 04/27/2022    Influenza Vaccine 11/07/2022     Health Maintenance Due   Topic Date Due    COVID-19 Vaccine (5 - Mixed Product series) 10/11/2021       PHYSICAL EXAM:  Vitals:    07/14/23 1500   BP: 125/88   BP Location: Left arm   Patient Position: Sitting   BP Method: Large (Automatic)   Pulse: 85   Resp: 16   Temp: 98.7 °F (37.1 °C)   TempSrc: Oral   SpO2: 96%   Weight: 98.4 kg (217 lb)   Height: 5' 9" (1.753 m)     Body mass index is 32.05 kg/m².  Physical Exam  Vitals and nursing note reviewed.   Constitutional:       General: She is not in acute distress.     Appearance: Normal appearance. She is normal weight. She is not ill-appearing or toxic-appearing.   HENT:      Head: Normocephalic and atraumatic.      Right Ear: External ear normal.      Left Ear: External ear normal.      Nose: Nose normal.      Mouth/Throat:      Mouth: Mucous membranes are moist.      Pharynx: Oropharynx is clear.   Eyes:      Extraocular Movements: Extraocular movements intact.      Conjunctiva/sclera: Conjunctivae normal.   Cardiovascular:      Rate and Rhythm: Normal rate and regular rhythm.      Pulses: Normal pulses.      Heart sounds: Normal heart sounds.   Pulmonary:      Effort: Pulmonary effort is normal. No respiratory distress.      Breath sounds: No stridor. No wheezing, rhonchi or rales.   Abdominal:      General: Abdomen is flat.      Palpations: Abdomen is soft.      Tenderness: There is no right CVA tenderness or left CVA tenderness.   Musculoskeletal:         General: No tenderness. Normal range of motion.      Cervical back: Normal range of motion and neck supple. " No rigidity or tenderness.      Right lower leg: No edema.      Left lower leg: No edema.   Skin:     General: Skin is warm and dry.      Capillary Refill: Capillary refill takes less than 2 seconds.   Neurological:      General: No focal deficit present.      Mental Status: She is alert and oriented to person, place, and time. Mental status is at baseline.      Motor: No weakness.      Gait: Gait normal.   Psychiatric:         Mood and Affect: Mood normal.         Behavior: Behavior normal.         Thought Content: Thought content normal.         Judgment: Judgment normal.      ASSESSMENT/PLAN:  1. Anemia, unspecified type  -     ferrous sulfate (FEOSOL) 325 mg (65 mg iron) Tab tablet; Take 1 tablet (325 mg total) by mouth once daily.  Dispense: 30 tablet; Refill: 5    2. Primary hypertension  Overview:  Strong family hx for hypertension (mom and dad, sister with hyperthyroid)  Current medication treatment:   Current Outpatient Medications on File Prior to Visit   Medication Sig Dispense Refill    JUNEL FE 1/20, 28, 1 mg-20 mcg (21)/75 mg (7) per tablet Take 1 tablet by mouth once daily.  12     No current facility-administered medications on file prior to visit.     Medication side effects: no medication side effects noted, fatigue, dry cough, swelling in ankles, weakness, orthostatic lightheadedness, myalgias, rash, nausea, abdominal discomfort, headaches, insomnia, weight gain, palpitations, slow heart rate, excessive urination, depression and wheezing  taking medications as instructed, no medication side effects noted, no TIAs, no chest pain on exertion, no dyspnea on exertion, no swelling of ankles  Exercise regimen: moderately active; three times weekly started in the past two weeks  Home BP cuff: No      Assessment & Plan:  Normotensive. Denies symptoms. Continue with current regimen.   - amlodipine 5 mg daily  - follow low sodium diet <2 grams or 2 teaspoons daily      3. Mixed  hyperlipidemia  Overview:  Diet modifications and increasing physical activity.       4. Iron deficiency anemia, unspecified iron deficiency anemia type  Overview:  Lab Results   Component Value Date    IRON 55 09/16/2020    TIBC 305 09/16/2020    LABIRON 18 (L) 09/16/2020      Lab Results   Component Value Date    WBC 7.13 09/16/2020    HGB 10.9 (L) 09/16/2020    HCT 34.8 (L) 09/16/2020    MCV 82 09/16/2020     09/16/2020             5. Class 1 obesity without serious comorbidity with body mass index (BMI) of 32.0 to 32.9 in adult, unspecified obesity type  Overview:  Wt Readings from Last 8 Encounters:   07/14/23 98.4 kg (217 lb)   07/07/23 98 kg (216 lb)   01/03/23 98.9 kg (218 lb)   12/02/22 99.3 kg (219 lb)   10/31/22 100.2 kg (221 lb)   05/14/19 92.1 kg (203 lb)   09/18/18 92.2 kg (203 lb 4.2 oz)   05/28/18 92.5 kg (204 lb)         Assessment & Plan:  Encouraged dietary modification and increasing daily physical activity.         6. Vitamin D insufficiency  Overview:  - daily 5000 IU vitamin D3  - incorporate into diet, vitamin D fortified foods, cereal, yogurt, fresh salmon, canned sardines, tuna and  mushrooms    Assessment & Plan:  Continue with above regimen.         Other than changes above, continue current medications and maintain follow up with specialists.      Follow up in about 3 months (around 10/14/2023) for Med recheck, flu vaccine, recheck cbc.   Recent Results (from the past 2016 hour(s))   Vitamin B12    Collection Time: 07/07/23  2:53 PM   Result Value Ref Range    Vitamin B-12 1020 (H) 210 - 950 pg/mL   Ferritin    Collection Time: 07/07/23  2:53 PM   Result Value Ref Range    Ferritin 29 20.0 - 300.0 ng/mL   Iron and TIBC    Collection Time: 07/07/23  2:53 PM   Result Value Ref Range    Iron 45 30 - 160 ug/dL    TIBC 309 250 - 450 ug/dL    Iron Saturation 15 (L) 20 - 50 %   RPR    Collection Time: 07/07/23  2:53 PM   Result Value Ref Range    RPR Non-reactive Non-reactive   Hepatitis  C Antibody    Collection Time: 07/07/23  2:53 PM   Result Value Ref Range    Hepatitis C Ab Non-reactive Non-reactive   HIV 1/2 Ag/Ab (4th Gen)    Collection Time: 07/07/23  2:53 PM   Result Value Ref Range    HIV 1/2 Ag/Ab Non-reactive Non-reactive   Lipid Panel    Collection Time: 07/07/23  2:53 PM   Result Value Ref Range    Cholesterol 234 (H) 120 - 199 mg/dL    Triglycerides 182 (H) 30 - 150 mg/dL    HDL 35 (L) 40 - 75 mg/dL    LDL Cholesterol 162.6 (H) 63.0 - 159.0 mg/dL    HDL/Cholesterol Ratio 15.0 (L) 20.0 - 50.0 %    Total Cholesterol/HDL Ratio 6.7 (H) 2.0 - 5.0    Non-HDL Cholesterol 199 mg/dL   Comprehensive Metabolic Panel    Collection Time: 07/07/23  2:53 PM   Result Value Ref Range    Sodium 138 136 - 145 mmol/L    Potassium 3.6 3.5 - 5.1 mmol/L    Chloride 107 95 - 110 mmol/L    CO2 27 23 - 29 mmol/L    Glucose 100 70 - 110 mg/dL    BUN 7 6 - 20 mg/dL    Creatinine 0.7 0.5 - 1.4 mg/dL    Calcium 8.8 8.7 - 10.5 mg/dL    Total Protein 8.2 6.0 - 8.4 g/dL    Albumin 3.4 (L) 3.5 - 5.2 g/dL    Total Bilirubin 0.2 0.1 - 1.0 mg/dL    Alkaline Phosphatase 48 (L) 55 - 135 U/L    AST 17 10 - 40 U/L    ALT 20 10 - 44 U/L    eGFR >60.0 >60 mL/min/1.73 m^2    Anion Gap 4 (L) 8 - 16 mmol/L   CBC Auto Differential    Collection Time: 07/07/23  2:53 PM   Result Value Ref Range    WBC 7.02 3.90 - 12.70 K/uL    RBC 4.82 4.00 - 5.40 M/uL    Hemoglobin 12.1 12.0 - 16.0 g/dL    Hematocrit 38.6 37.0 - 48.5 %    MCV 80 (L) 82 - 98 fL    MCH 25.1 (L) 27.0 - 31.0 pg    MCHC 31.3 (L) 32.0 - 36.0 g/dL    RDW 13.5 11.5 - 14.5 %    Platelets 273 150 - 450 K/uL    MPV 9.2 9.2 - 12.9 fL    Immature Granulocytes 0.1 0.0 - 0.5 %    Gran # (ANC) 4.1 1.8 - 7.7 K/uL    Immature Grans (Abs) 0.01 0.00 - 0.04 K/uL    Lymph # 2.4 1.0 - 4.8 K/uL    Mono # 0.4 0.3 - 1.0 K/uL    Eos # 0.1 0.0 - 0.5 K/uL    Baso # 0.02 0.00 - 0.20 K/uL    nRBC 0 0 /100 WBC    Gran % 58.8 38.0 - 73.0 %    Lymph % 33.5 18.0 - 48.0 %    Mono % 5.7 4.0 - 15.0 %     Eosinophil % 1.6 0.0 - 8.0 %    Basophil % 0.3 0.0 - 1.9 %    Differential Method Automated      DO [facility]

## 2023-07-14 NOTE — PATIENT INSTRUCTIONS
- To help improve your cardiovascular health and reduce your risk of stroke or heart attack, the following healthy lifestyle changes are recommended.   - Choose whole food items, fresh/frozen fruits and vegetables and unprocessed meats.  - Avoid processed food items, cold cuts, canned foods in sugary and high sodium preservatives.   - Reduce intake of highly refined carbohydrates or white starchy foods, white bread, white flour pasta, sugary cereals, sugary drinks, sweet tea, soda including diet, candies, cakes and donuts.    - Reduce or avoid saturated fats (fats from animals and processed oils like palm oil, peanut oil, vegetable oil) and fried food items.   - Increase healthy fats like omega-3 fish oil, fatty fish (salmon, mackerel, sardine etc), olive oil, avocado, raw nuts etc.   - Increase fiber intake with daily goal of 6-8 servings of vegetables.  - Remember to maintain daily adequate hydration with water 1.5 to 2 liters fluid volume.    - Daily physical activities, start slow with 10-15 minutes of walk daily, then increase as tolerated to twice daily.   - Cardiovascular exercise also improves your cholesterol levels and your goal is low intensity (like walking and biking) 150 min/week to moderate/high intensity 75 min/week.  - Daily 5000 IU vitamin D3  - Incorporate into diet, vitamin D fortified foods, cereal, yogurt, fresh salmon, canned sardines, tuna and mushrooms

## 2023-07-14 NOTE — ASSESSMENT & PLAN NOTE
Normotensive. Denies symptoms. Continue with current regimen.   - amlodipine 5 mg daily  - follow low sodium diet <2 grams or 2 teaspoons daily

## 2024-04-19 ENCOUNTER — OFFICE VISIT (OUTPATIENT)
Dept: PRIMARY CARE CLINIC | Facility: CLINIC | Age: 34
End: 2024-04-19
Payer: MEDICAID

## 2024-04-19 VITALS
HEIGHT: 69 IN | SYSTOLIC BLOOD PRESSURE: 137 MMHG | BODY MASS INDEX: 32.91 KG/M2 | HEART RATE: 73 BPM | DIASTOLIC BLOOD PRESSURE: 81 MMHG | WEIGHT: 222.19 LBS | RESPIRATION RATE: 16 BRPM | TEMPERATURE: 98 F | OXYGEN SATURATION: 99 %

## 2024-04-19 DIAGNOSIS — D50.9 IRON DEFICIENCY ANEMIA, UNSPECIFIED IRON DEFICIENCY ANEMIA TYPE: ICD-10-CM

## 2024-04-19 DIAGNOSIS — I10 PRIMARY HYPERTENSION: Primary | ICD-10-CM

## 2024-04-19 DIAGNOSIS — E88.819 INSULIN RESISTANCE: ICD-10-CM

## 2024-04-19 DIAGNOSIS — A64 STI (SEXUALLY TRANSMITTED INFECTION): ICD-10-CM

## 2024-04-19 DIAGNOSIS — E78.2 MIXED HYPERLIPIDEMIA: ICD-10-CM

## 2024-04-19 DIAGNOSIS — Z12.4 CERVICAL CANCER SCREENING: ICD-10-CM

## 2024-04-19 DIAGNOSIS — E66.9 CLASS 1 OBESITY WITHOUT SERIOUS COMORBIDITY WITH BODY MASS INDEX (BMI) OF 32.0 TO 32.9 IN ADULT, UNSPECIFIED OBESITY TYPE: ICD-10-CM

## 2024-04-19 DIAGNOSIS — E55.9 VITAMIN D INSUFFICIENCY: ICD-10-CM

## 2024-04-19 PROCEDURE — 99999 PR PBB SHADOW E&M-EST. PATIENT-LVL IV: CPT | Mod: PBBFAC,,, | Performed by: STUDENT IN AN ORGANIZED HEALTH CARE EDUCATION/TRAINING PROGRAM

## 2024-04-19 PROCEDURE — 99214 OFFICE O/P EST MOD 30 MIN: CPT | Mod: S$PBB,,, | Performed by: STUDENT IN AN ORGANIZED HEALTH CARE EDUCATION/TRAINING PROGRAM

## 2024-04-19 PROCEDURE — 99214 OFFICE O/P EST MOD 30 MIN: CPT | Mod: PBBFAC | Performed by: STUDENT IN AN ORGANIZED HEALTH CARE EDUCATION/TRAINING PROGRAM

## 2024-04-19 PROCEDURE — 3079F DIAST BP 80-89 MM HG: CPT | Mod: CPTII,,, | Performed by: STUDENT IN AN ORGANIZED HEALTH CARE EDUCATION/TRAINING PROGRAM

## 2024-04-19 PROCEDURE — 3075F SYST BP GE 130 - 139MM HG: CPT | Mod: CPTII,,, | Performed by: STUDENT IN AN ORGANIZED HEALTH CARE EDUCATION/TRAINING PROGRAM

## 2024-04-19 PROCEDURE — 3008F BODY MASS INDEX DOCD: CPT | Mod: CPTII,,, | Performed by: STUDENT IN AN ORGANIZED HEALTH CARE EDUCATION/TRAINING PROGRAM

## 2024-04-19 PROCEDURE — 1159F MED LIST DOCD IN RCRD: CPT | Mod: CPTII,,, | Performed by: STUDENT IN AN ORGANIZED HEALTH CARE EDUCATION/TRAINING PROGRAM

## 2024-04-19 PROCEDURE — 1160F RVW MEDS BY RX/DR IN RCRD: CPT | Mod: CPTII,,, | Performed by: STUDENT IN AN ORGANIZED HEALTH CARE EDUCATION/TRAINING PROGRAM

## 2024-04-19 RX ORDER — AMLODIPINE BESYLATE 5 MG/1
5 TABLET ORAL DAILY
Qty: 30 TABLET | Refills: 11 | Status: SHIPPED | OUTPATIENT
Start: 2024-04-19 | End: 2025-04-19

## 2024-04-19 RX ORDER — NORETHINDRONE ACETATE AND ETHINYL ESTRADIOL, ETHINYL ESTRADIOL AND FERROUS FUMARATE 1MG-10(24)
1 KIT ORAL DAILY
COMMUNITY
Start: 2024-02-08

## 2024-04-19 NOTE — ASSESSMENT & PLAN NOTE
BP Readings from Last 3 Encounters:   04/19/24 137/81   07/14/23 125/88   07/07/23 129/78      Out of meds, Refill amlodipine.   - follow low-sodium diet <2 g or 2 teaspoons daily  - avoid processed and preserved foods, ie microwave meals, pickles, canned fruits  - incorporate whole vegetables and fruits and meats  - increase daily physical activity

## 2024-04-19 NOTE — ASSESSMENT & PLAN NOTE
Encouraged dietary modification and increasing daily physical activity.   Modify diet to eat healthier meals, list of food items and meals to substitute provided.   Goal weight is 200 pounds, 10 % weight loss in the next 3 months.   Recommendations:   Stay physically active. As tolerated alternate resistance training with stretching and cardio. Goal of 150 minutes per week of moderate intensity activity or 7,500 - 10,000 steps per day. Follow the Mediterranean Diet. Include whole fresh fruits, vegetables, olive oil, seeds, nuts, whole grains, cold water fish, salmon, mackerel and lean cuts of meat.  Do not drink sugary/diet carbonated beverages. Decrease portion sizes slightly which will result in an approximately 500-calorie deficit. Avoid fast or fried and processed food, especially canned foods. Avoid refined carbohydrates, white starchy foods, flour, white potato, bread, muffins, and cakes. Consider substituting one meal a day with a meal replacement such as Slim fast, lean cuisine, or weight watcher's. Follow a healthy diet that includes enough calcium, vitamin D and proteins for bone health.

## 2024-04-19 NOTE — ASSESSMENT & PLAN NOTE
Take iron supplement on empty stomach or with 4-8 ounces of orange juice about 1-2 hours before meals. Do not take with acid reducing medicines, like pepcid or pantoprazole. If you develop constipation or upset stomach, then take with food or immediately after eating.   Remember to add in diet, iron fortified cereal, eggs, lentils, chickpeas, green leafy vegetables like spinach and kale.  4/19/24 Per patient taking daily prenatal vitamins. Denies symptoms.   - f/u CBC and iron panel

## 2024-04-19 NOTE — ASSESSMENT & PLAN NOTE
Patient has started to cut out daily 3-4 cans of sodas and joined a gym to exercise 2-3 times per week.   4/19/24 Drinks water and occasional cranberry juice, no carbonated drinks, cut out late night snacks from diet.  - f/u lipid panel

## 2024-04-19 NOTE — PROGRESS NOTES
"[facility] Note    HPI:  Mauricio Garcia is a 33 y.o. female who presents today for Follow-up and Medication Refill (Pt here for f/u appt/ rx refill)  33 year old with hypertension, dyslipidemia, prediabetes presents for follow up.   Patient has been out of her antihypertensive meds for the past 1 month and have noticed her headaches have returned intermittently.   Denies interim hospitalizations, ER visits.   Patient has stopped her daily walks, but have continued working out on weekends.   Following diet of one meal that is not balanced or described as "junk food."  Here for weight management discussion.   Denies fever, chills, vision changes, chest pain, palpitations, shortness of breath, abdominal pain, nausea, vomiting, constipation, diarrhea.     ROS   A review of systems was performed and was negative except as noted above.    I personally reviewed allergies, past medical, surgical, social and family history and updated as appropriate.    Medications:    Current Outpatient Medications:     LO LOESTRIN FE 1 mg-10 mcg (24)/10 mcg (2) Tab, Take 1 tablet by mouth once daily., Disp: , Rfl:     amLODIPine (NORVASC) 5 MG tablet, Take 1 tablet (5 mg total) by mouth once daily., Disp: 30 tablet, Rfl: 11    JUNEL FE 1/20, 28, 1 mg-20 mcg (21)/75 mg (7) per tablet, Take 1 tablet by mouth once daily. (Patient not taking: Reported on 4/19/2024), Disp: , Rfl: 12     Health Maintenance:  Immunization History   Administered Date(s) Administered    COVID-19, MRNA, LN-S, PF (MODERNA FULL 0.5 ML DOSE) 03/08/2021, 08/16/2021    COVID-19, MRNA, LN-S, PF (Pfizer) (Purple Cap) 05/27/2021, 06/17/2021    DTP 03/30/1989, 04/05/1989, 05/25/1989, 05/31/1989, 08/02/1989, 08/24/1989, 05/30/1990, 04/13/1994    Hepatitis B 10/28/2002    Hepatitis B, Adult 12/10/2007, 05/03/2018, 06/08/2018, 11/20/2018    Hepatitis B, Pediatric/Adolescent 03/19/2003, 03/02/2005, 07/31/2006    IPV 03/30/1989, 05/25/1989    " "Influenza - Quadrivalent - PF *Preferred* (6 months and older) 10/09/2018, 10/29/2019, 10/06/2020, 12/22/2021, 11/07/2022    Influenza A (H1N1) 2009 Monovalent - IM 11/16/2009    MMR 05/30/1990, 04/13/1994, 01/29/2008    OPV 04/05/1989, 05/31/1989, 05/30/1990, 04/13/1994    Td - PF (ADULT) 10/28/2002, 03/02/2005    Tdap 10/21/2016, 09/05/2017, 12/23/2021      Health Maintenance   Topic Date Due    TETANUS VACCINE  12/23/2031    Hepatitis C Screening  Completed    Lipid Panel  Completed     Health Maintenance Topics with due status: Not Due       Topic Last Completion Date    TETANUS VACCINE 12/23/2021    Cervical Cancer Screening 04/27/2022    Hemoglobin A1c (Diabetic Prevention Screening) 12/02/2022     Health Maintenance Due   Topic Date Due    COVID-19 Vaccine (5 - 2023-24 season) 09/01/2023       PHYSICAL EXAM:  Vitals:    04/19/24 0835   BP: 137/81   BP Location: Right arm   Patient Position: Sitting   BP Method: Large (Automatic)   Pulse: 73   Resp: 16   Temp: 98.2 °F (36.8 °C)   TempSrc: Temporal   SpO2: 99%   Weight: 100.8 kg (222 lb 3.2 oz)   Height: 5' 9" (1.753 m)     Body mass index is 32.81 kg/m².  Physical Exam  Vitals and nursing note reviewed.   Constitutional:       General: She is not in acute distress.     Appearance: Normal appearance. She is normal weight. She is not ill-appearing or toxic-appearing.   HENT:      Head: Normocephalic and atraumatic.      Right Ear: External ear normal.      Left Ear: External ear normal.      Nose: Nose normal.      Mouth/Throat:      Mouth: Mucous membranes are moist.      Pharynx: Oropharynx is clear.   Eyes:      Extraocular Movements: Extraocular movements intact.      Conjunctiva/sclera: Conjunctivae normal.   Cardiovascular:      Rate and Rhythm: Normal rate and regular rhythm.      Pulses: Normal pulses.      Heart sounds: Normal heart sounds.   Pulmonary:      Effort: Pulmonary effort is normal. No respiratory distress.      Breath sounds: No stridor. No " wheezing, rhonchi or rales.   Abdominal:      General: Abdomen is flat.      Palpations: Abdomen is soft.      Tenderness: There is no right CVA tenderness or left CVA tenderness.   Musculoskeletal:         General: No tenderness. Normal range of motion.      Cervical back: Normal range of motion and neck supple. No rigidity or tenderness.      Right lower leg: No edema.      Left lower leg: No edema.   Skin:     General: Skin is warm and dry.      Capillary Refill: Capillary refill takes less than 2 seconds.   Neurological:      General: No focal deficit present.      Mental Status: She is alert and oriented to person, place, and time. Mental status is at baseline.      Motor: No weakness.      Gait: Gait normal.   Psychiatric:         Mood and Affect: Mood normal.         Behavior: Behavior normal.         Thought Content: Thought content normal.         Judgment: Judgment normal.          ASSESSMENT/PLAN:  1. Primary hypertension  Overview:  Strong family hx for hypertension (mom and dad, sister with hyperthyroid)  Current medication treatment:   Current Outpatient Medications on File Prior to Visit   Medication Sig Dispense Refill    JUNEL FE 1/20, 28, 1 mg-20 mcg (21)/75 mg (7) per tablet Take 1 tablet by mouth once daily.  12     No current facility-administered medications on file prior to visit.     Medication side effects: no medication side effects noted, fatigue, dry cough, swelling in ankles, weakness, orthostatic lightheadedness, myalgias, rash, nausea, abdominal discomfort, headaches, insomnia, weight gain, palpitations, slow heart rate, excessive urination, depression and wheezing  taking medications as instructed, no medication side effects noted, no TIAs, no chest pain on exertion, no dyspnea on exertion, no swelling of ankles  Exercise regimen: moderately active; three times weekly started in the past two weeks  Home BP cuff: No      Assessment & Plan:  BP Readings from Last 3 Encounters:    04/19/24 137/81   07/14/23 125/88   07/07/23 129/78      Out of meds, Refill amlodipine.   - follow low-sodium diet <2 g or 2 teaspoons daily  - avoid processed and preserved foods, ie microwave meals, pickles, canned fruits  - incorporate whole vegetables and fruits and meats  - increase daily physical activity      Orders:  -     Comprehensive Metabolic Panel; Future; Expected date: 04/19/2024  -     amLODIPine (NORVASC) 5 MG tablet; Take 1 tablet (5 mg total) by mouth once daily.  Dispense: 30 tablet; Refill: 11    2. Mixed hyperlipidemia  Overview:  Diet modifications and increasing physical activity.     Assessment & Plan:  Patient has started to cut out daily 3-4 cans of sodas and joined a gym to exercise 2-3 times per week.   4/19/24 Drinks water and occasional cranberry juice, no carbonated drinks, cut out late night snacks from diet.  - f/u lipid panel          Orders:  -     Lipid Panel; Future; Expected date: 04/19/2024    3. Iron deficiency anemia, unspecified iron deficiency anemia type  Overview:  Lab Results   Component Value Date    IRON 55 09/16/2020    TIBC 305 09/16/2020    LABIRON 18 (L) 09/16/2020      Lab Results   Component Value Date    WBC 7.13 09/16/2020    HGB 10.9 (L) 09/16/2020    HCT 34.8 (L) 09/16/2020    MCV 82 09/16/2020     09/16/2020           Assessment & Plan:  Take iron supplement on empty stomach or with 4-8 ounces of orange juice about 1-2 hours before meals. Do not take with acid reducing medicines, like pepcid or pantoprazole. If you develop constipation or upset stomach, then take with food or immediately after eating.   Remember to add in diet, iron fortified cereal, eggs, lentils, chickpeas, green leafy vegetables like spinach and kale.  4/19/24 Per patient taking daily prenatal vitamins. Denies symptoms.   - f/u CBC and iron panel    Orders:  -     CBC Auto Differential; Future; Expected date: 04/19/2024  -     Iron and TIBC; Future; Expected date: 04/19/2024  -      Ferritin; Future; Expected date: 04/19/2024    4. Class 1 obesity without serious comorbidity with body mass index (BMI) of 32.0 to 32.9 in adult, unspecified obesity type  Overview:  Wt Readings from Last 8 Encounters:   04/19/24 100.8 kg (222 lb 3.2 oz)   07/14/23 98.4 kg (217 lb)   07/07/23 98 kg (216 lb)   01/03/23 98.9 kg (218 lb)   12/02/22 99.3 kg (219 lb)   10/31/22 100.2 kg (221 lb)   05/14/19 92.1 kg (203 lb)   09/18/18 92.2 kg (203 lb 4.2 oz)         Assessment & Plan:  Encouraged dietary modification and increasing daily physical activity.   Modify diet to eat healthier meals, list of food items and meals to substitute provided.   Goal weight is 200 pounds, 10 % weight loss in the next 3 months.   Recommendations:   Stay physically active. As tolerated alternate resistance training with stretching and cardio. Goal of 150 minutes per week of moderate intensity activity or 7,500 - 10,000 steps per day. Follow the Mediterranean Diet. Include whole fresh fruits, vegetables, olive oil, seeds, nuts, whole grains, cold water fish, salmon, mackerel and lean cuts of meat.  Do not drink sugary/diet carbonated beverages. Decrease portion sizes slightly which will result in an approximately 500-calorie deficit. Avoid fast or fried and processed food, especially canned foods. Avoid refined carbohydrates, white starchy foods, flour, white potato, bread, muffins, and cakes. Consider substituting one meal a day with a meal replacement such as Slim fast, lean cuisine, or weight watcher's. Follow a healthy diet that includes enough calcium, vitamin D and proteins for bone health.        5. Vitamin D insufficiency  Overview:  - daily 5000 IU vitamin D3  - incorporate into diet, vitamin D fortified foods, cereal, yogurt, fresh salmon, canned sardines, tuna and  mushrooms    Assessment & Plan:  Continue with above regimen.       6. Insulin resistance  -     Hemoglobin A1C; Future; Expected date: 04/19/2024    7. STI  (sexually transmitted infection)  -     RPR; Future; Expected date: 04/19/2024  -     HIV 1/2 Ag/Ab (4th Gen); Future; Expected date: 04/19/2024    8. Cervical cancer screening  Assessment & Plan:  Patient has appointment with primary GYN beginning of May.   - f/u notes from GYN specialist          Other than changes above, continue current medications and maintain follow up with specialists.      Follow up in about 4 weeks (around 5/17/2024) for Lab review, review diet.   No results found for this or any previous visit (from the past 2016 hour(s)).      Kazumi G Yoshinaga, DO [facility]

## 2024-05-31 ENCOUNTER — OFFICE VISIT (OUTPATIENT)
Dept: PRIMARY CARE CLINIC | Facility: CLINIC | Age: 34
End: 2024-05-31
Payer: MEDICAID

## 2024-05-31 VITALS
WEIGHT: 221.81 LBS | TEMPERATURE: 98 F | OXYGEN SATURATION: 100 % | DIASTOLIC BLOOD PRESSURE: 64 MMHG | RESPIRATION RATE: 16 BRPM | HEART RATE: 84 BPM | BODY MASS INDEX: 32.85 KG/M2 | SYSTOLIC BLOOD PRESSURE: 120 MMHG | HEIGHT: 69 IN

## 2024-05-31 DIAGNOSIS — E78.2 MIXED HYPERLIPIDEMIA: Primary | ICD-10-CM

## 2024-05-31 DIAGNOSIS — I10 PRIMARY HYPERTENSION: ICD-10-CM

## 2024-05-31 DIAGNOSIS — Z76.89 ENCOUNTER FOR WEIGHT MANAGEMENT: ICD-10-CM

## 2024-05-31 DIAGNOSIS — Z28.21 IMMUNIZATION DECLINED: ICD-10-CM

## 2024-05-31 DIAGNOSIS — Z12.4 CERVICAL CANCER SCREENING: ICD-10-CM

## 2024-05-31 DIAGNOSIS — D50.9 IRON DEFICIENCY ANEMIA, UNSPECIFIED IRON DEFICIENCY ANEMIA TYPE: ICD-10-CM

## 2024-05-31 DIAGNOSIS — E66.9 CLASS 1 OBESITY WITHOUT SERIOUS COMORBIDITY WITH BODY MASS INDEX (BMI) OF 32.0 TO 32.9 IN ADULT, UNSPECIFIED OBESITY TYPE: ICD-10-CM

## 2024-05-31 PROCEDURE — 99213 OFFICE O/P EST LOW 20 MIN: CPT | Mod: PBBFAC | Performed by: STUDENT IN AN ORGANIZED HEALTH CARE EDUCATION/TRAINING PROGRAM

## 2024-05-31 PROCEDURE — 3078F DIAST BP <80 MM HG: CPT | Mod: CPTII,,, | Performed by: STUDENT IN AN ORGANIZED HEALTH CARE EDUCATION/TRAINING PROGRAM

## 2024-05-31 PROCEDURE — 99999 PR PBB SHADOW E&M-EST. PATIENT-LVL III: CPT | Mod: PBBFAC,,, | Performed by: STUDENT IN AN ORGANIZED HEALTH CARE EDUCATION/TRAINING PROGRAM

## 2024-05-31 PROCEDURE — 3074F SYST BP LT 130 MM HG: CPT | Mod: CPTII,,, | Performed by: STUDENT IN AN ORGANIZED HEALTH CARE EDUCATION/TRAINING PROGRAM

## 2024-05-31 PROCEDURE — 1159F MED LIST DOCD IN RCRD: CPT | Mod: CPTII,,, | Performed by: STUDENT IN AN ORGANIZED HEALTH CARE EDUCATION/TRAINING PROGRAM

## 2024-05-31 PROCEDURE — 99214 OFFICE O/P EST MOD 30 MIN: CPT | Mod: S$PBB,,, | Performed by: STUDENT IN AN ORGANIZED HEALTH CARE EDUCATION/TRAINING PROGRAM

## 2024-05-31 PROCEDURE — 3008F BODY MASS INDEX DOCD: CPT | Mod: CPTII,,, | Performed by: STUDENT IN AN ORGANIZED HEALTH CARE EDUCATION/TRAINING PROGRAM

## 2024-05-31 PROCEDURE — 1160F RVW MEDS BY RX/DR IN RCRD: CPT | Mod: CPTII,,, | Performed by: STUDENT IN AN ORGANIZED HEALTH CARE EDUCATION/TRAINING PROGRAM

## 2024-05-31 RX ORDER — PHENTERMINE HYDROCHLORIDE 37.5 MG/1
37.5 TABLET ORAL
Qty: 30 TABLET | Refills: 2 | Status: SHIPPED | OUTPATIENT
Start: 2024-05-31

## 2024-05-31 RX ORDER — ATORVASTATIN CALCIUM 20 MG/1
20 TABLET, FILM COATED ORAL DAILY
Qty: 90 TABLET | Refills: 3 | Status: SHIPPED | OUTPATIENT
Start: 2024-05-31 | End: 2025-05-31

## 2024-05-31 NOTE — ASSESSMENT & PLAN NOTE
Patient's anemia is currently controlled. Has not received any PRBCs to date. Etiology likely d/t Iron deficiency  Current CBC reviewed-   Lab Results   Component Value Date    HGB 12.1 07/07/2023    HCT 38.6 07/07/2023     Monitor serial CBC.   Improved iron reserve. Continue with daily supplement which patient endorses tolerating without side effects.   Counseled on necessary dietary intake of micronutrients including iron, vitamin B12, folate, vitamin D, zinc, magnesium.  - counseled on iron fortified food items to incorporate in daily meals  - wheat germ, dried fruits, nuts and seeds, whole grain and fortified breads and cereals, dried beans, lentils, and split peas, leafy, dark-green vegetables, eggs  - continue daily iron supplementation, take with orange juice  - f/u 6-8 weeks with labs

## 2024-05-31 NOTE — ASSESSMENT & PLAN NOTE
Chronic, controlled. Latest blood pressure and vitals reviewed-   BP Readings from Last 3 Encounters:   05/31/24 120/64   04/19/24 137/81   07/14/23 125/88   Tolerating amlodipine without adverse effects. Continue current dose and monitor.   - follow low-sodium diet <2 g or 2 teaspoons daily  - avoid processed and preserved foods, ie microwave meals, pickles, canned fruits  - incorporate whole vegetables and fruits and meats  - increase daily physical activity  - avoid being near smoking

## 2024-05-31 NOTE — PROGRESS NOTES
[facility] Note    HPI:  Mauricio Garcia is a 33 y.o. female who presents today for Follow-up (Pt here for 1 mo f/u lab results)  33 year old with hypertension, mixed hyperlipidemia, prediabetes presents for follow up lab work from outside facility.   Reviewed labs  4/22/24  , , HDL 32,   Hg 11.8, HCT 35, WBC 7.6,   Normal renal and hepatic functions.   Patient endorses have resumed her daily walks minimal 2 miles daily. Drinking water with cutting out all carbonated drinks. Increasing fruits and vegetables and have weakness for snacking on unhealthy foods.   Overall feeling better, have the energy to get through the day and her work outs.   Sleeping 6-8 hours of uninterrupted with no sleepiness during the day time.      ROS   A review of systems was performed and was negative except as noted above.    I personally reviewed allergies, past medical, surgical, social and family history and updated as appropriate.    Medications:    Current Outpatient Medications:     amLODIPine (NORVASC) 5 MG tablet, Take 1 tablet (5 mg total) by mouth once daily., Disp: 30 tablet, Rfl: 11    LO LOESTRIN FE 1 mg-10 mcg (24)/10 mcg (2) Tab, Take 1 tablet by mouth once daily., Disp: , Rfl:     JUNEL FE 1/20, 28, 1 mg-20 mcg (21)/75 mg (7) per tablet, Take 1 tablet by mouth once daily. (Patient not taking: Reported on 4/19/2024), Disp: , Rfl: 12     Health Maintenance:  Immunization History   Administered Date(s) Administered    COVID-19, MRNA, LN-S, PF (MODERNA FULL 0.5 ML DOSE) 03/08/2021, 08/16/2021    COVID-19, MRNA, LN-S, PF (Pfizer) (Purple Cap) 05/27/2021, 06/17/2021    DTP 03/30/1989, 04/05/1989, 05/25/1989, 05/31/1989, 08/02/1989, 08/24/1989, 05/30/1990, 04/13/1994    Hepatitis B 10/28/2002    Hepatitis B, Adult 12/10/2007, 05/03/2018, 06/08/2018, 11/20/2018    Hepatitis B, Pediatric/Adolescent 03/19/2003, 03/02/2005, 07/31/2006    IPV 03/30/1989, 05/25/1989    Influenza -  "Quadrivalent - PF *Preferred* (6 months and older) 10/09/2018, 10/29/2019, 10/06/2020, 12/22/2021, 11/07/2022    Influenza A (H1N1) 2009 Monovalent - IM 11/16/2009    MMR 05/30/1990, 04/13/1994, 01/29/2008    OPV 04/05/1989, 05/31/1989, 05/30/1990, 04/13/1994    Td - PF (ADULT) 10/28/2002, 03/02/2005    Tdap 10/21/2016, 09/05/2017, 12/23/2021      Health Maintenance   Topic Date Due    TETANUS VACCINE  12/23/2031    Hepatitis C Screening  Completed    Lipid Panel  Completed     Health Maintenance Topics with due status: Not Due       Topic Last Completion Date    TETANUS VACCINE 12/23/2021    Cervical Cancer Screening 04/27/2022    Hemoglobin A1c (Diabetic Prevention Screening) 12/02/2022     Health Maintenance Due   Topic Date Due    COVID-19 Vaccine (5 - 2023-24 season) 09/01/2023       PHYSICAL EXAM:  Vitals:    05/31/24 0829   BP: 120/64   BP Location: Right arm   Patient Position: Sitting   BP Method: Large (Automatic)   Pulse: 84   Resp: 16   Temp: 98.4 °F (36.9 °C)   TempSrc: Oral   SpO2: 100%   Weight: 100.6 kg (221 lb 12.8 oz)   Height: 5' 9" (1.753 m)     Body mass index is 32.75 kg/m².  Physical Exam  Vitals and nursing note reviewed.   Constitutional:       General: She is not in acute distress.     Appearance: Normal appearance. She is normal weight. She is not ill-appearing or toxic-appearing.   HENT:      Head: Normocephalic and atraumatic.      Right Ear: External ear normal.      Left Ear: External ear normal.      Nose: Nose normal.      Mouth/Throat:      Mouth: Mucous membranes are moist.      Pharynx: Oropharynx is clear.   Eyes:      Extraocular Movements: Extraocular movements intact.      Conjunctiva/sclera: Conjunctivae normal.   Cardiovascular:      Rate and Rhythm: Normal rate and regular rhythm.      Pulses: Normal pulses.      Heart sounds: Normal heart sounds.   Pulmonary:      Effort: Pulmonary effort is normal. No respiratory distress.      Breath sounds: No stridor. No wheezing, " rhonchi or rales.   Chest:      Chest wall: No tenderness.   Abdominal:      General: Abdomen is flat. There is no distension.      Palpations: Abdomen is soft.      Tenderness: There is no abdominal tenderness. There is no right CVA tenderness, left CVA tenderness or guarding.   Musculoskeletal:         General: No tenderness. Normal range of motion.      Cervical back: Normal range of motion and neck supple. No rigidity or tenderness.      Right lower leg: No edema.      Left lower leg: No edema.   Skin:     General: Skin is warm and dry.      Capillary Refill: Capillary refill takes less than 2 seconds.   Neurological:      General: No focal deficit present.      Mental Status: She is alert and oriented to person, place, and time. Mental status is at baseline.      Motor: No weakness.      Gait: Gait normal.   Psychiatric:         Mood and Affect: Mood normal.         Behavior: Behavior normal.         Thought Content: Thought content normal.         Judgment: Judgment normal.          ASSESSMENT/PLAN:  1. Mixed hyperlipidemia  Overview:  Diet modifications and increasing physical activity.       2. Primary hypertension  Overview:  Strong family hx for hypertension (dad no longer living with heart disease, mother and sister living with pharmacologic intervention)   Current medication treatment:   Current Outpatient Medications on File Prior to Visit   Medication Sig Dispense Refill    JUNEL FE 1/20, 28, 1 mg-20 mcg (21)/75 mg (7) per tablet Take 1 tablet by mouth once daily.  12     No current facility-administered medications on file prior to visit.     Medication side effects: no medication side effects noted, fatigue, dry cough, swelling in ankles, weakness, orthostatic lightheadedness, myalgias, rash, nausea, abdominal discomfort, headaches, insomnia, weight gain, palpitations, slow heart rate, excessive urination, depression and wheezing  taking medications as instructed, no medication side effects noted, no  TIAs, no chest pain on exertion, no dyspnea on exertion, no swelling of ankles  Exercise regimen: moderately active; three times weekly started in the past two weeks  Home BP cuff: No      Assessment & Plan:  Chronic, controlled. Latest blood pressure and vitals reviewed-   BP Readings from Last 3 Encounters:   05/31/24 120/64   04/19/24 137/81   07/14/23 125/88   Tolerating amlodipine without adverse effects. Continue current dose and monitor.   - follow low-sodium diet <2 g or 2 teaspoons daily  - avoid processed and preserved foods, ie microwave meals, pickles, canned fruits  - incorporate whole vegetables and fruits and meats  - increase daily physical activity  - avoid being near smoking      3. Class 1 obesity without serious comorbidity with body mass index (BMI) of 32.0 to 32.9 in adult, unspecified obesity type  Overview:  Wt Readings from Last 8 Encounters:   04/19/24 100.8 kg (222 lb 3.2 oz)   07/14/23 98.4 kg (217 lb)   07/07/23 98 kg (216 lb)   01/03/23 98.9 kg (218 lb)   12/02/22 99.3 kg (219 lb)   10/31/22 100.2 kg (221 lb)   05/14/19 92.1 kg (203 lb)   09/18/18 92.2 kg (203 lb 4.2 oz)           4. Encounter for weight management    5. Cervical cancer screening  Assessment & Plan:  Patient has appointment with primary GYN beginning of May.   - f/u notes from GYN specialist      6. Immunization declined  Assessment & Plan:          7. Iron deficiency anemia, unspecified iron deficiency anemia type  Overview:  Lab Results   Component Value Date    IRON 55 09/16/2020    TIBC 305 09/16/2020    LABIRON 18 (L) 09/16/2020      Lab Results   Component Value Date    WBC 7.13 09/16/2020    HGB 10.9 (L) 09/16/2020    HCT 34.8 (L) 09/16/2020    MCV 82 09/16/2020     09/16/2020           Assessment & Plan:  Patient's anemia is currently controlled. Has not received any PRBCs to date. Etiology likely d/t Iron deficiency  Current CBC reviewed-   Lab Results   Component Value Date    HGB 12.1 07/07/2023    HCT  38.6 07/07/2023     Monitor serial CBC.   Improved iron reserve. Continue with daily supplement which patient endorses tolerating without side effects.   Counseled on necessary dietary intake of micronutrients including iron, vitamin B12, folate, vitamin D, zinc, magnesium.  - counseled on iron fortified food items to incorporate in daily meals  - wheat germ, dried fruits, nuts and seeds, whole grain and fortified breads and cereals, dried beans, lentils, and split peas, leafy, dark-green vegetables, eggs  - continue daily iron supplementation, take with orange juice  - f/u 6-8 weeks with labs           Other than changes above, continue current medications and maintain follow up with specialists.      No follow-ups on file.   No results found for this or any previous visit (from the past 2016 hour(s)).      Kazumi G Yoshinaga, DO [facility]